# Patient Record
Sex: FEMALE | Race: WHITE | NOT HISPANIC OR LATINO | URBAN - METROPOLITAN AREA
[De-identification: names, ages, dates, MRNs, and addresses within clinical notes are randomized per-mention and may not be internally consistent; named-entity substitution may affect disease eponyms.]

---

## 2017-01-09 ENCOUNTER — OUTPATIENT (OUTPATIENT)
Dept: OUTPATIENT SERVICES | Facility: HOSPITAL | Age: 67
LOS: 1 days | End: 2017-01-09
Payer: MEDICARE

## 2017-01-09 ENCOUNTER — APPOINTMENT (OUTPATIENT)
Dept: MRI IMAGING | Facility: IMAGING CENTER | Age: 67
End: 2017-01-09

## 2017-01-09 DIAGNOSIS — Z00.8 ENCOUNTER FOR OTHER GENERAL EXAMINATION: ICD-10-CM

## 2017-01-09 PROCEDURE — A9585: CPT

## 2017-01-09 PROCEDURE — C8908: CPT

## 2017-01-09 PROCEDURE — C8937: CPT

## 2017-01-09 PROCEDURE — 82565 ASSAY OF CREATININE: CPT

## 2017-07-28 ENCOUNTER — OUTPATIENT (OUTPATIENT)
Dept: OUTPATIENT SERVICES | Facility: HOSPITAL | Age: 67
LOS: 1 days | End: 2017-07-28
Payer: MEDICARE

## 2017-07-28 ENCOUNTER — APPOINTMENT (OUTPATIENT)
Dept: ULTRASOUND IMAGING | Facility: IMAGING CENTER | Age: 67
End: 2017-07-28
Payer: MEDICARE

## 2017-07-28 ENCOUNTER — APPOINTMENT (OUTPATIENT)
Dept: MAMMOGRAPHY | Facility: IMAGING CENTER | Age: 67
End: 2017-07-28
Payer: MEDICARE

## 2017-07-28 DIAGNOSIS — Z00.8 ENCOUNTER FOR OTHER GENERAL EXAMINATION: ICD-10-CM

## 2017-07-28 PROCEDURE — G0279: CPT | Mod: 26

## 2017-07-28 PROCEDURE — G0204: CPT | Mod: 26

## 2017-07-28 PROCEDURE — 76641 ULTRASOUND BREAST COMPLETE: CPT | Mod: 26,50

## 2017-07-28 PROCEDURE — 76641 ULTRASOUND BREAST COMPLETE: CPT

## 2017-07-28 PROCEDURE — 77066 DX MAMMO INCL CAD BI: CPT

## 2017-07-28 PROCEDURE — G0279: CPT

## 2017-10-04 ENCOUNTER — APPOINTMENT (OUTPATIENT)
Dept: SURGERY | Facility: CLINIC | Age: 67
End: 2017-10-04
Payer: MEDICARE

## 2017-10-04 PROCEDURE — 99213K: CUSTOM

## 2018-01-10 ENCOUNTER — OUTPATIENT (OUTPATIENT)
Dept: OUTPATIENT SERVICES | Facility: HOSPITAL | Age: 68
LOS: 1 days | End: 2018-01-10
Payer: MEDICARE

## 2018-01-10 ENCOUNTER — APPOINTMENT (OUTPATIENT)
Dept: MRI IMAGING | Facility: IMAGING CENTER | Age: 68
End: 2018-01-10
Payer: MEDICARE

## 2018-01-10 DIAGNOSIS — Z00.8 ENCOUNTER FOR OTHER GENERAL EXAMINATION: ICD-10-CM

## 2018-01-11 PROCEDURE — A9585: CPT

## 2018-01-11 PROCEDURE — 77059 MRI BREAST BILATERAL: CPT | Mod: 26

## 2018-01-11 PROCEDURE — C8937: CPT

## 2018-01-11 PROCEDURE — C8908: CPT

## 2018-01-11 PROCEDURE — 0159T: CPT | Mod: 26

## 2018-07-30 ENCOUNTER — OUTPATIENT (OUTPATIENT)
Dept: OUTPATIENT SERVICES | Facility: HOSPITAL | Age: 68
LOS: 1 days | End: 2018-07-30
Payer: MEDICARE

## 2018-07-30 ENCOUNTER — APPOINTMENT (OUTPATIENT)
Dept: MAMMOGRAPHY | Facility: IMAGING CENTER | Age: 68
End: 2018-07-30
Payer: MEDICARE

## 2018-07-30 ENCOUNTER — APPOINTMENT (OUTPATIENT)
Dept: ULTRASOUND IMAGING | Facility: IMAGING CENTER | Age: 68
End: 2018-07-30
Payer: MEDICARE

## 2018-07-30 DIAGNOSIS — Z00.8 ENCOUNTER FOR OTHER GENERAL EXAMINATION: ICD-10-CM

## 2018-07-30 PROCEDURE — 76641 ULTRASOUND BREAST COMPLETE: CPT | Mod: 26,50

## 2018-07-30 PROCEDURE — 77066 DX MAMMO INCL CAD BI: CPT | Mod: 26

## 2018-07-30 PROCEDURE — 76641 ULTRASOUND BREAST COMPLETE: CPT

## 2018-07-30 PROCEDURE — G0279: CPT | Mod: 26

## 2018-07-30 PROCEDURE — G0279: CPT

## 2018-07-30 PROCEDURE — 77066 DX MAMMO INCL CAD BI: CPT

## 2018-10-29 ENCOUNTER — APPOINTMENT (OUTPATIENT)
Dept: SURGERY | Facility: CLINIC | Age: 68
End: 2018-10-29
Payer: MEDICARE

## 2018-10-29 PROCEDURE — 99213K: CUSTOM

## 2019-01-16 ENCOUNTER — OUTPATIENT (OUTPATIENT)
Dept: OUTPATIENT SERVICES | Facility: HOSPITAL | Age: 69
LOS: 1 days | End: 2019-01-16
Payer: MEDICARE

## 2019-01-16 ENCOUNTER — APPOINTMENT (OUTPATIENT)
Dept: MRI IMAGING | Facility: IMAGING CENTER | Age: 69
End: 2019-01-16
Payer: MEDICARE

## 2019-01-16 DIAGNOSIS — Z00.8 ENCOUNTER FOR OTHER GENERAL EXAMINATION: ICD-10-CM

## 2019-01-16 PROCEDURE — 77049 MRI BREAST C-+ W/CAD BI: CPT | Mod: 26

## 2019-01-16 PROCEDURE — C8937: CPT

## 2019-01-16 PROCEDURE — A9585: CPT

## 2019-01-16 PROCEDURE — C8908: CPT

## 2019-07-31 ENCOUNTER — APPOINTMENT (OUTPATIENT)
Dept: ULTRASOUND IMAGING | Facility: IMAGING CENTER | Age: 69
End: 2019-07-31
Payer: MEDICARE

## 2019-07-31 ENCOUNTER — APPOINTMENT (OUTPATIENT)
Dept: MAMMOGRAPHY | Facility: IMAGING CENTER | Age: 69
End: 2019-07-31
Payer: MEDICARE

## 2019-07-31 ENCOUNTER — OUTPATIENT (OUTPATIENT)
Dept: OUTPATIENT SERVICES | Facility: HOSPITAL | Age: 69
LOS: 1 days | End: 2019-07-31
Payer: MEDICARE

## 2019-07-31 DIAGNOSIS — Z00.8 ENCOUNTER FOR OTHER GENERAL EXAMINATION: ICD-10-CM

## 2019-07-31 PROCEDURE — 77066 DX MAMMO INCL CAD BI: CPT | Mod: 26

## 2019-07-31 PROCEDURE — G0279: CPT | Mod: 26

## 2019-07-31 PROCEDURE — 76641 ULTRASOUND BREAST COMPLETE: CPT | Mod: 26,50

## 2019-07-31 PROCEDURE — 77066 DX MAMMO INCL CAD BI: CPT

## 2019-07-31 PROCEDURE — 76641 ULTRASOUND BREAST COMPLETE: CPT

## 2019-07-31 PROCEDURE — G0279: CPT

## 2019-10-16 ENCOUNTER — APPOINTMENT (OUTPATIENT)
Dept: SURGERY | Facility: CLINIC | Age: 69
End: 2019-10-16
Payer: MEDICARE

## 2019-10-16 PROCEDURE — 99213K: CUSTOM

## 2020-01-13 ENCOUNTER — OUTPATIENT (OUTPATIENT)
Dept: OUTPATIENT SERVICES | Facility: HOSPITAL | Age: 70
LOS: 1 days | End: 2020-01-13
Payer: MEDICARE

## 2020-01-13 ENCOUNTER — APPOINTMENT (OUTPATIENT)
Dept: MRI IMAGING | Facility: IMAGING CENTER | Age: 70
End: 2020-01-13
Payer: MEDICARE

## 2020-01-13 DIAGNOSIS — Z00.8 ENCOUNTER FOR OTHER GENERAL EXAMINATION: ICD-10-CM

## 2020-01-13 PROCEDURE — A9585: CPT

## 2020-01-13 PROCEDURE — C8937: CPT

## 2020-01-13 PROCEDURE — 77049 MRI BREAST C-+ W/CAD BI: CPT | Mod: 26

## 2020-01-13 PROCEDURE — C8908: CPT

## 2020-04-09 ENCOUNTER — APPOINTMENT (OUTPATIENT)
Dept: INTERNAL MEDICINE | Facility: CLINIC | Age: 70
End: 2020-04-09

## 2020-04-14 DIAGNOSIS — Z85.3 PERSONAL HISTORY OF MALIGNANT NEOPLASM OF BREAST: ICD-10-CM

## 2020-07-16 ENCOUNTER — TRANSCRIPTION ENCOUNTER (OUTPATIENT)
Age: 70
End: 2020-07-16

## 2020-07-16 ENCOUNTER — APPOINTMENT (OUTPATIENT)
Dept: INTERNAL MEDICINE | Facility: CLINIC | Age: 70
End: 2020-07-16
Payer: MEDICARE

## 2020-07-16 VITALS
DIASTOLIC BLOOD PRESSURE: 70 MMHG | RESPIRATION RATE: 16 BRPM | TEMPERATURE: 98.2 F | HEIGHT: 63 IN | BODY MASS INDEX: 32.43 KG/M2 | WEIGHT: 183 LBS | SYSTOLIC BLOOD PRESSURE: 118 MMHG | OXYGEN SATURATION: 97 % | HEART RATE: 87 BPM

## 2020-07-16 DIAGNOSIS — Z80.3 FAMILY HISTORY OF MALIGNANT NEOPLASM OF BREAST: ICD-10-CM

## 2020-07-16 PROCEDURE — 36415 COLL VENOUS BLD VENIPUNCTURE: CPT

## 2020-07-16 PROCEDURE — G0439: CPT

## 2020-07-16 RX ORDER — CEPHALEXIN 500 MG/1
500 CAPSULE ORAL 4 TIMES DAILY
Qty: 30 | Refills: 0 | Status: DISCONTINUED | COMMUNITY
Start: 2018-07-02 | End: 2020-07-16

## 2020-07-16 NOTE — HEALTH RISK ASSESSMENT
[Very Good] : ~his/her~  mood as very good [Yes] : Yes [Monthly or less (1 pt)] : Monthly or less (1 point) [1 or 2 (0 pts)] : 1 or 2 (0 points) [Never (0 pts)] : Never (0 points) [No] : In the past 12 months have you used drugs other than those required for medical reasons? No [No falls in past year] : Patient reported no falls in the past year [0] : 1) Little interest or pleasure doing things: Not at all (0) [No Retinopathy] : No retinopathy [Patient reported PAP Smear was normal] : Patient reported PAP Smear was normal [Patient reported mammogram was normal] : Patient reported mammogram was normal [Patient reported bone density results were normal] : Patient reported bone density results were normal [Patient reported colonoscopy was normal] : Patient reported colonoscopy was normal [With Significant Other] : lives with significant other [Graduate School] : graduate school [Retired] : retired [] :  [# Of Children ___] : has [unfilled] children [Sexually Active] : sexually active [Feels Safe at Home] : Feels safe at home [Fully functional (using the telephone, shopping, preparing meals, housekeeping, doing laundry, using] : Fully functional and needs no help or supervision to perform IADLs (using the telephone, shopping, preparing meals, housekeeping, doing laundry, using transportation, managing medications and managing finances) [Fully functional (bathing, dressing, toileting, transferring, walking, feeding)] : Fully functional (bathing, dressing, toileting, transferring, walking, feeding) [Smoke Detector] : smoke detector [Carbon Monoxide Detector] : carbon monoxide detector [Safety elements used in home] : safety elements used in home [Sunscreen] : uses sunscreen [Seat Belt] :  uses seat belt [With Patient/Caregiver] : With Patient/Caregiver [de-identified] : walking  [EyeExamDate] : 8/19 [High Risk Behavior] : no high risk behavior [Reports changes in hearing] : Reports no changes in hearing [Reports changes in vision] : Reports no changes in vision [Reports normal functional visual acuity (ie: able to read med bottle)] : Reports poor functional visual acuity.  [Reports changes in dental health] : Reports no changes in dental health [Guns at Home] : no guns at home [Travel to Developing Areas] : does not  travel to developing areas [TB Exposure] : is not being exposed to tuberculosis [Caregiver Concerns] : does not have caregiver concerns [MammogramDate] : 07/19 [MammogramComments] : appt next week  [PapSmearDate] : 2018 [BoneDensityDate] : 2018 [ColonoscopyDate] : 9/18 [ColonoscopyComments] : next due in 2023 [de-identified] : teacher [AdvancecareDate] : 07/16

## 2020-07-16 NOTE — HISTORY OF PRESENT ILLNESS
[FreeTextEntry1] : Here for annual full PE  [de-identified] : here for annual full PE, complains of left knee pain especially following her walking. Also doing a lot of cleaning. Lost about 10-12 lbs and her labs are better.

## 2020-07-16 NOTE — PHYSICAL EXAM
[No Masses] : no palpable masses [No Nipple Discharge] : no nipple discharge [No Axillary Lymphadenopathy] : no axillary lymphadenopathy [Declined Rectal Exam] : declined rectal exam [Normal] : affect was normal and insight and judgment were intact [de-identified] : decreased volume on the left with RT changes , no dc no masses  [de-identified] : obese

## 2020-07-16 NOTE — ASSESSMENT
[FreeTextEntry1] : 1. DM : wt is down 11 lbs since 11/19( 194 per old records ) likely her A1c is as well, to have labs drawn today. \par 2. Breast Ca: still see Dr Bautista and now off all adjuvant meds\par 3. HCM: up to date , going to Optho in [place], mammo in aug and colon due in 2023 per old records\par 4 Meds: losartan for renal protection, Farxiga 10, lipitor 10.

## 2020-07-20 LAB
ALBUMIN SERPL ELPH-MCNC: 5.1 G/DL
ALP BLD-CCNC: 91 U/L
ALT SERPL-CCNC: 38 U/L
ANION GAP SERPL CALC-SCNC: 18 MMOL/L
AST SERPL-CCNC: 31 U/L
BASOPHILS # BLD AUTO: 0.05 K/UL
BASOPHILS NFR BLD AUTO: 0.9 %
BILIRUB SERPL-MCNC: 0.6 MG/DL
BUN SERPL-MCNC: 15 MG/DL
CALCIUM SERPL-MCNC: 9.9 MG/DL
CHLORIDE SERPL-SCNC: 99 MMOL/L
CHOLEST SERPL-MCNC: 161 MG/DL
CHOLEST/HDLC SERPL: 3.2 RATIO
CO2 SERPL-SCNC: 23 MMOL/L
CREAT SERPL-MCNC: 0.69 MG/DL
EOSINOPHIL # BLD AUTO: 0.11 K/UL
EOSINOPHIL NFR BLD AUTO: 2 %
ESTIMATED AVERAGE GLUCOSE: 146 MG/DL
GLUCOSE SERPL-MCNC: 85 MG/DL
HBA1C MFR BLD HPLC: 6.7 %
HCT VFR BLD CALC: 54.1 %
HDLC SERPL-MCNC: 50 MG/DL
HGB BLD-MCNC: 16.7 G/DL
IMM GRANULOCYTES NFR BLD AUTO: 0.2 %
LDLC SERPL CALC-MCNC: 92 MG/DL
LYMPHOCYTES # BLD AUTO: 1.59 K/UL
LYMPHOCYTES NFR BLD AUTO: 28.3 %
MAN DIFF?: NORMAL
MCHC RBC-ENTMCNC: 29.6 PG
MCHC RBC-ENTMCNC: 30.9 GM/DL
MCV RBC AUTO: 95.8 FL
MONOCYTES # BLD AUTO: 0.52 K/UL
MONOCYTES NFR BLD AUTO: 9.3 %
NEUTROPHILS # BLD AUTO: 3.33 K/UL
NEUTROPHILS NFR BLD AUTO: 59.3 %
PLATELET # BLD AUTO: 193 K/UL
POTASSIUM SERPL-SCNC: 5.3 MMOL/L
PROT SERPL-MCNC: 7.3 G/DL
RBC # BLD: 5.65 M/UL
RBC # FLD: 14 %
SODIUM SERPL-SCNC: 140 MMOL/L
TRIGL SERPL-MCNC: 93 MG/DL
TSH SERPL-ACNC: 1.9 UIU/ML
WBC # FLD AUTO: 5.61 K/UL

## 2020-07-21 ENCOUNTER — RX RENEWAL (OUTPATIENT)
Age: 70
End: 2020-07-21

## 2020-08-03 ENCOUNTER — RESULT REVIEW (OUTPATIENT)
Age: 70
End: 2020-08-03

## 2020-08-03 ENCOUNTER — OUTPATIENT (OUTPATIENT)
Dept: OUTPATIENT SERVICES | Facility: HOSPITAL | Age: 70
LOS: 1 days | End: 2020-08-03
Payer: MEDICARE

## 2020-08-03 ENCOUNTER — APPOINTMENT (OUTPATIENT)
Dept: ULTRASOUND IMAGING | Facility: IMAGING CENTER | Age: 70
End: 2020-08-03
Payer: MEDICARE

## 2020-08-03 ENCOUNTER — APPOINTMENT (OUTPATIENT)
Dept: MAMMOGRAPHY | Facility: IMAGING CENTER | Age: 70
End: 2020-08-03
Payer: MEDICARE

## 2020-08-03 DIAGNOSIS — Z00.8 ENCOUNTER FOR OTHER GENERAL EXAMINATION: ICD-10-CM

## 2020-08-03 PROCEDURE — G0279: CPT

## 2020-08-03 PROCEDURE — 76641 ULTRASOUND BREAST COMPLETE: CPT

## 2020-08-03 PROCEDURE — 76641 ULTRASOUND BREAST COMPLETE: CPT | Mod: 26,50

## 2020-08-03 PROCEDURE — 77063 BREAST TOMOSYNTHESIS BI: CPT

## 2020-08-03 PROCEDURE — 77067 SCR MAMMO BI INCL CAD: CPT | Mod: 26

## 2020-08-03 PROCEDURE — 77066 DX MAMMO INCL CAD BI: CPT

## 2020-08-03 PROCEDURE — 77063 BREAST TOMOSYNTHESIS BI: CPT | Mod: 26

## 2020-08-03 PROCEDURE — 77067 SCR MAMMO BI INCL CAD: CPT

## 2020-10-05 ENCOUNTER — APPOINTMENT (OUTPATIENT)
Dept: ORTHOPEDIC SURGERY | Facility: CLINIC | Age: 70
End: 2020-10-05
Payer: MEDICARE

## 2020-10-05 VITALS
HEART RATE: 76 BPM | HEIGHT: 63 IN | BODY MASS INDEX: 32.43 KG/M2 | SYSTOLIC BLOOD PRESSURE: 113 MMHG | TEMPERATURE: 97.8 F | DIASTOLIC BLOOD PRESSURE: 71 MMHG | WEIGHT: 183 LBS

## 2020-10-05 DIAGNOSIS — Z82.61 FAMILY HISTORY OF ARTHRITIS: ICD-10-CM

## 2020-10-05 DIAGNOSIS — Z78.9 OTHER SPECIFIED HEALTH STATUS: ICD-10-CM

## 2020-10-05 DIAGNOSIS — Z56.0 UNEMPLOYMENT, UNSPECIFIED: ICD-10-CM

## 2020-10-05 DIAGNOSIS — Z87.39 PERSONAL HISTORY OF OTHER DISEASES OF THE MUSCULOSKELETAL SYSTEM AND CONNECTIVE TISSUE: ICD-10-CM

## 2020-10-05 PROCEDURE — 99204 OFFICE O/P NEW MOD 45 MIN: CPT

## 2020-10-05 PROCEDURE — 73564 X-RAY EXAM KNEE 4 OR MORE: CPT | Mod: LT

## 2020-10-05 SDOH — ECONOMIC STABILITY - INCOME SECURITY: UNEMPLOYMENT, UNSPECIFIED: Z56.0

## 2020-10-05 NOTE — DISCUSSION/SUMMARY
[de-identified] : The patient has arthritis of the left knee.  I have discussed the pathology, natural history and treatment options with her.  She will try a course of over-the-counter medication.  If this is not satisfactory she will consider physical therapy.  She will work on home exercises.  She will return as needed.

## 2020-10-05 NOTE — PHYSICAL EXAM
[LE] : Sensory: Intact in bilateral lower extremities [Knee] : patellar 2+ and symmetric bilaterally [Ankle] : ankle 2+ and symmetric bilaterally [Plant] : plantar 2+ and symmetric bilaterally [DP] : dorsalis pedis 2+ and symmetric bilaterally [PT] : posterior tibial 2+ and symmetric bilaterally [Normal] : Alert and in no acute distress [Poor Appearance] : well-appearing [Acute Distress] : not in acute distress [Obese] : not obese [de-identified] : The patient has no respiratory distress. Mood and affect are normal. The patient is alert and oriented to person, place and time.\par There is no pain with active or passive motion of the hips.  There is no tenderness of either hip.  Examination of the knees demonstrates mild anteromedial tenderness of the left knee.  Quadriceps and hamstring function are intact.  The collateral and cruciate ligaments are stable bilaterally.  Range of motion 0 to 120 degrees left, 0 to 125 degrees right.  The calves are soft and nontender.  The skin is intact.  There is no lymphedema. [de-identified] : AP, lateral, tunnel and sunrise x-rays of the left knee demonstrate no fracture or dislocation.  There are degenerative changes especially in the medial and patellofemoral compartments.

## 2020-10-05 NOTE — HISTORY OF PRESENT ILLNESS
[de-identified] : 70 year old female presents today with left knee pain x 3 months. She complains of pain on the medial aspect of the knee. She states that she has been walking one hour per day during the pandemic. She experiences pain in her knee after waking. The pain improves with a couple days of rest. She has pain when standing from a low chair and with stair climbing. She has tried ice and mineral ice with no relief.

## 2020-10-13 ENCOUNTER — RX RENEWAL (OUTPATIENT)
Age: 70
End: 2020-10-13

## 2020-10-14 ENCOUNTER — APPOINTMENT (OUTPATIENT)
Dept: SURGERY | Facility: CLINIC | Age: 70
End: 2020-10-14
Payer: MEDICARE

## 2020-10-14 PROCEDURE — 99213K: CUSTOM

## 2020-10-16 LAB
ESTIMATED AVERAGE GLUCOSE: 154 MG/DL
HBA1C MFR BLD HPLC: 7 %

## 2020-10-19 LAB
ALBUMIN SERPL ELPH-MCNC: 4.6 G/DL
ALP BLD-CCNC: 77 U/L
ALT SERPL-CCNC: 28 U/L
ANION GAP SERPL CALC-SCNC: 15 MMOL/L
AST SERPL-CCNC: 26 U/L
BILIRUB SERPL-MCNC: 0.6 MG/DL
BUN SERPL-MCNC: 13 MG/DL
CALCIUM SERPL-MCNC: 9.3 MG/DL
CHLORIDE SERPL-SCNC: 103 MMOL/L
CO2 SERPL-SCNC: 21 MMOL/L
CREAT SERPL-MCNC: 0.62 MG/DL
GLUCOSE SERPL-MCNC: 137 MG/DL
POTASSIUM SERPL-SCNC: 4.5 MMOL/L
PROT SERPL-MCNC: 6.6 G/DL
SODIUM SERPL-SCNC: 139 MMOL/L

## 2020-10-20 ENCOUNTER — APPOINTMENT (OUTPATIENT)
Dept: INTERNAL MEDICINE | Facility: CLINIC | Age: 70
End: 2020-10-20
Payer: MEDICARE

## 2020-10-20 VITALS
TEMPERATURE: 98.2 F | SYSTOLIC BLOOD PRESSURE: 110 MMHG | WEIGHT: 186 LBS | HEIGHT: 63 IN | OXYGEN SATURATION: 96 % | DIASTOLIC BLOOD PRESSURE: 70 MMHG | BODY MASS INDEX: 32.96 KG/M2 | HEART RATE: 82 BPM

## 2020-10-20 PROCEDURE — 99213 OFFICE O/P EST LOW 20 MIN: CPT | Mod: 25

## 2020-10-20 PROCEDURE — G0008: CPT

## 2020-10-20 PROCEDURE — 90662 IIV NO PRSV INCREASED AG IM: CPT

## 2020-10-20 NOTE — HISTORY OF PRESENT ILLNESS
[de-identified] : No new complaints. Went to Ortho due to a painful L knee, dx of OA , walking  and try NSAIDS prn and generally doing well. REceived the Flu vaccine  [FreeTextEntry1] : Here for follow up

## 2020-10-20 NOTE — ASSESSMENT
[FreeTextEntry1] : 1. DM : not walking as much , A1c has increased from 6.7 to 7, will try again to walk more, and has an elliptical machine at home to increase that \par 2. HLD controlled \par 3 Received flu vaccine \par 4 . F/u in 3 mos.

## 2021-01-19 ENCOUNTER — RX RENEWAL (OUTPATIENT)
Age: 71
End: 2021-01-19

## 2021-01-19 ENCOUNTER — LABORATORY RESULT (OUTPATIENT)
Age: 71
End: 2021-01-19

## 2021-01-21 ENCOUNTER — APPOINTMENT (OUTPATIENT)
Dept: INTERNAL MEDICINE | Facility: CLINIC | Age: 71
End: 2021-01-21
Payer: MEDICARE

## 2021-01-21 VITALS
HEART RATE: 84 BPM | TEMPERATURE: 98.1 F | BODY MASS INDEX: 33.66 KG/M2 | OXYGEN SATURATION: 97 % | SYSTOLIC BLOOD PRESSURE: 140 MMHG | HEIGHT: 63 IN | DIASTOLIC BLOOD PRESSURE: 77 MMHG | WEIGHT: 190 LBS

## 2021-01-21 VITALS — SYSTOLIC BLOOD PRESSURE: 130 MMHG | DIASTOLIC BLOOD PRESSURE: 80 MMHG

## 2021-01-21 PROCEDURE — 99214 OFFICE O/P EST MOD 30 MIN: CPT

## 2021-01-21 NOTE — ASSESSMENT
[FreeTextEntry1] : 1. DM : from 6.7 to 7. 0 wt is up about 5 lbs, to try to increase her exercise, decrease her nighttime eating \par 2. choles: 143/52/70/103 perfect control\par 3. Breast Ca : per onc, MRI on 2.4.21\par 4. Heme: for genetic testing and lab follow up. \par 5 labs renewed

## 2021-01-21 NOTE — HISTORY OF PRESENT ILLNESS
[FreeTextEntry1] : Here for follow up, received first vaccine( Pfizer) received was tired for 24 hours  [de-identified] : No complaints, having a hard time walking as her left knee is bothering her( and seen by ortho) . Mother( 98) received her vaccine . Seen by Servando and concerned about a mildly elevated Lymph and Hct

## 2021-02-04 ENCOUNTER — RESULT REVIEW (OUTPATIENT)
Age: 71
End: 2021-02-04

## 2021-02-04 ENCOUNTER — APPOINTMENT (OUTPATIENT)
Dept: MRI IMAGING | Facility: IMAGING CENTER | Age: 71
End: 2021-02-04
Payer: MEDICARE

## 2021-02-04 ENCOUNTER — OUTPATIENT (OUTPATIENT)
Dept: OUTPATIENT SERVICES | Facility: HOSPITAL | Age: 71
LOS: 1 days | End: 2021-02-04
Payer: MEDICARE

## 2021-02-04 DIAGNOSIS — Z00.8 ENCOUNTER FOR OTHER GENERAL EXAMINATION: ICD-10-CM

## 2021-02-04 PROCEDURE — C8908: CPT

## 2021-02-04 PROCEDURE — 77049 MRI BREAST C-+ W/CAD BI: CPT | Mod: 26,MH

## 2021-02-04 PROCEDURE — C8937: CPT

## 2021-02-04 PROCEDURE — A9585: CPT

## 2021-04-19 ENCOUNTER — LABORATORY RESULT (OUTPATIENT)
Age: 71
End: 2021-04-19

## 2021-04-22 ENCOUNTER — APPOINTMENT (OUTPATIENT)
Dept: INTERNAL MEDICINE | Facility: CLINIC | Age: 71
End: 2021-04-22
Payer: MEDICARE

## 2021-04-22 VITALS
OXYGEN SATURATION: 96 % | BODY MASS INDEX: 33.66 KG/M2 | DIASTOLIC BLOOD PRESSURE: 68 MMHG | HEIGHT: 63 IN | WEIGHT: 190 LBS | HEART RATE: 95 BPM | SYSTOLIC BLOOD PRESSURE: 126 MMHG | TEMPERATURE: 98.2 F

## 2021-04-22 PROCEDURE — 99214 OFFICE O/P EST MOD 30 MIN: CPT

## 2021-04-22 NOTE — HISTORY OF PRESENT ILLNESS
[FreeTextEntry1] : Here for follow up. Doing well however her A1c  is up to 7.8 [de-identified] : Has noticed that her fasting fs is up at 140's . Otherwise well. Does not feel that her eating habits have not changed.

## 2021-04-22 NOTE — ASSESSMENT
[FreeTextEntry1] : 1. DM : from 6.7 to 7. 0 to 7.7  wt is about the same. Still  eating at night, riley nuts or turkey and cheese , at about 11 pm . She is not really hungry  at night , eating out of stress, suggested to try some herbal  tea, decaf drink. Will add some Januvia to the regimen, if no improvement then consider some GLP-1 inhibitor \par 2. choles: 143/52/70/103 perfect control\par 3. Breast Ca : per onc, MRI on 2.4.21 was wnl\par 4. Heme: for genetic testing  no changed and not markers found and lab follow up. \par 5 labs renewed all wnl except a1c/glucose. \par full PE in 3 mos.

## 2021-04-23 ENCOUNTER — TRANSCRIPTION ENCOUNTER (OUTPATIENT)
Age: 71
End: 2021-04-23

## 2021-07-16 ENCOUNTER — LABORATORY RESULT (OUTPATIENT)
Age: 71
End: 2021-07-16

## 2021-07-18 ENCOUNTER — NON-APPOINTMENT (OUTPATIENT)
Age: 71
End: 2021-07-18

## 2021-07-19 ENCOUNTER — LABORATORY RESULT (OUTPATIENT)
Age: 71
End: 2021-07-19

## 2021-07-20 ENCOUNTER — APPOINTMENT (OUTPATIENT)
Dept: INTERNAL MEDICINE | Facility: CLINIC | Age: 71
End: 2021-07-20
Payer: MEDICARE

## 2021-07-20 VITALS
SYSTOLIC BLOOD PRESSURE: 130 MMHG | HEIGHT: 63 IN | BODY MASS INDEX: 34.55 KG/M2 | OXYGEN SATURATION: 95 % | WEIGHT: 195 LBS | DIASTOLIC BLOOD PRESSURE: 78 MMHG | TEMPERATURE: 97.8 F | HEART RATE: 108 BPM

## 2021-07-20 PROCEDURE — G0444 DEPRESSION SCREEN ANNUAL: CPT

## 2021-07-20 PROCEDURE — G0439: CPT

## 2021-07-20 NOTE — PHYSICAL EXAM
[Normal Appearance] : normal in appearance [No Masses] : no palpable masses [No Nipple Discharge] : no nipple discharge [Normal Sphincter Tone] : normal sphincter tone [No Mass] : no mass [Stool Occult Blood] : stool negative for occult blood [Normal] : affect was normal and insight and judgment were intact [de-identified] : decreased volume on the left

## 2021-07-20 NOTE — HEALTH RISK ASSESSMENT
[] : No [No] : No [No falls in past year] : Patient reported no falls in the past year [0] : 2) Feeling down, depressed, or hopeless: Not at all (0) [facility] [Patient reported mammogram was normal] : Patient reported mammogram was normal [None] : None [With Significant Other] : lives with significant other [# of Members in Household ___] :  household currently consist of [unfilled] member(s) [Retired] : retired [Graduate School] : graduate school [] :  [# Of Children ___] : has [unfilled] children [Feels Safe at Home] : Feels safe at home [Seat Belt] :  uses seat belt [Sunscreen] : uses sunscreen [Travel to Developing Areas] : does not  travel to developing areas [TB Exposure] : is not being exposed to tuberculosis [Caregiver Concerns] : does not have caregiver concerns [MammogramDate] : 8/20 [PapSmearDate] : n/a [ColonoscopyDate] : 2018 [ColonoscopyComments] : 2023 due  [FreeTextEntry2] : teacher  [I will adhere to the patient's wishes.] : I will adhere to the patient's wishes. [AdvancecareDate] : 7/20/21

## 2021-07-20 NOTE — ASSESSMENT
[FreeTextEntry1] : 1. DM : from 7.8 to 7.1   wt is about the same. Still  eating at night, riley nuts or turkey and cheese , at about 11 pm . She is not really hungry  at night , eating out of stress, suggested to try some herbal  tea, decaf drink. Will add some Januvia to the regimen, if no improvement then consider some GLP-1 inhibitor \par 2. choles: 150/46/77 141  perfect control\par 3. Breast Ca : per onc, MRI on 2.4.21 was wnl and mammo in August due , now seeing Dr Jesus Garza \par 4. Heme:  genetic testing   and still no change and no markers found and lab follow up. \par 5 labs renewed all wnl except a1c/glucose. \par 6. HCM pna x2 received, shingrix x 2 received, covid x 2 received, mammo in Aug, colon on 2023, optho Aug. Heme routine follow up, gyn in Feb \par  in 3 mos.

## 2021-08-04 ENCOUNTER — OUTPATIENT (OUTPATIENT)
Dept: OUTPATIENT SERVICES | Facility: HOSPITAL | Age: 71
LOS: 1 days | End: 2021-08-04
Payer: MEDICARE

## 2021-08-04 ENCOUNTER — APPOINTMENT (OUTPATIENT)
Dept: MAMMOGRAPHY | Facility: IMAGING CENTER | Age: 71
End: 2021-08-04
Payer: MEDICARE

## 2021-08-04 ENCOUNTER — RESULT REVIEW (OUTPATIENT)
Age: 71
End: 2021-08-04

## 2021-08-04 ENCOUNTER — APPOINTMENT (OUTPATIENT)
Dept: ULTRASOUND IMAGING | Facility: IMAGING CENTER | Age: 71
End: 2021-08-04
Payer: MEDICARE

## 2021-08-04 DIAGNOSIS — Z00.8 ENCOUNTER FOR OTHER GENERAL EXAMINATION: ICD-10-CM

## 2021-08-04 PROCEDURE — 77063 BREAST TOMOSYNTHESIS BI: CPT | Mod: 26

## 2021-08-04 PROCEDURE — 77067 SCR MAMMO BI INCL CAD: CPT | Mod: 26

## 2021-08-04 PROCEDURE — 77063 BREAST TOMOSYNTHESIS BI: CPT

## 2021-08-04 PROCEDURE — 77067 SCR MAMMO BI INCL CAD: CPT

## 2021-08-04 PROCEDURE — 76641 ULTRASOUND BREAST COMPLETE: CPT | Mod: 26,50

## 2021-08-04 PROCEDURE — 76641 ULTRASOUND BREAST COMPLETE: CPT

## 2021-10-19 ENCOUNTER — APPOINTMENT (OUTPATIENT)
Dept: INTERNAL MEDICINE | Facility: CLINIC | Age: 71
End: 2021-10-19
Payer: MEDICARE

## 2021-10-19 VITALS
BODY MASS INDEX: 34.01 KG/M2 | WEIGHT: 192 LBS | SYSTOLIC BLOOD PRESSURE: 110 MMHG | OXYGEN SATURATION: 97 % | HEART RATE: 80 BPM | DIASTOLIC BLOOD PRESSURE: 70 MMHG | TEMPERATURE: 97.9 F

## 2021-10-19 DIAGNOSIS — M17.12 UNILATERAL PRIMARY OSTEOARTHRITIS, LEFT KNEE: ICD-10-CM

## 2021-10-19 DIAGNOSIS — Z23 ENCOUNTER FOR IMMUNIZATION: ICD-10-CM

## 2021-10-19 LAB — HBA1C MFR BLD HPLC: 7.1

## 2021-10-19 PROCEDURE — 90662 IIV NO PRSV INCREASED AG IM: CPT

## 2021-10-19 PROCEDURE — G0008: CPT

## 2021-10-19 PROCEDURE — 99214 OFFICE O/P EST MOD 30 MIN: CPT | Mod: 25

## 2021-10-19 PROCEDURE — 83036 HEMOGLOBIN GLYCOSYLATED A1C: CPT | Mod: QW

## 2021-10-19 NOTE — ASSESSMENT
[FreeTextEntry1] : 1. DM : from 7.8 to 7.1and will  recheck today,   wt is down 3 lbs. Still   stopped eating at night, riley nuts or turkey and cheese , at about 11 pm . Gen is eating out of stress, suggested to try some herbal  tea, decaf drink. Will add some Januvia to the regimen, if no improvement then consider some GLP-1 inhibitor \par 2. choles: 150/46/77/ 141  perfect control in the past . \par 3. Breast Ca : per onc, MRI on 2.4.21 was wnl and mammo in August due , now seeing Dr Jesus Garza \par 4. Heme:  genetic testing   and still no change and no markers found and lab follow up. \par 5 labs renewed all wnl except a1c/glucose. to repeat \par 6. HCM pna x2 received, shingrix x 2 received, covid x 2 received, mammo in Aug, colon on 2023, optho Aug. Heme routine follow up, gyn in Feb , HD flu in ra given today \par f/u  in 3 mos.

## 2021-10-19 NOTE — HISTORY OF PRESENT ILLNESS
[FreeTextEntry1] : Here for follow up for her dm and htn. . Doing well however her A1c  last = 7.1 [de-identified] : Here for f/u , no new complaints

## 2021-11-15 ENCOUNTER — APPOINTMENT (OUTPATIENT)
Dept: SURGERY | Facility: CLINIC | Age: 71
End: 2021-11-15
Payer: MEDICARE

## 2021-11-15 PROCEDURE — 99213K: CUSTOM

## 2021-11-16 ENCOUNTER — FORM ENCOUNTER (OUTPATIENT)
Age: 71
End: 2021-11-16

## 2022-01-10 ENCOUNTER — RX RENEWAL (OUTPATIENT)
Age: 72
End: 2022-01-10

## 2022-01-20 ENCOUNTER — APPOINTMENT (OUTPATIENT)
Dept: INTERNAL MEDICINE | Facility: CLINIC | Age: 72
End: 2022-01-20
Payer: MEDICARE

## 2022-01-20 VITALS
WEIGHT: 193 LBS | HEART RATE: 99 BPM | HEIGHT: 63 IN | DIASTOLIC BLOOD PRESSURE: 82 MMHG | TEMPERATURE: 98.1 F | SYSTOLIC BLOOD PRESSURE: 122 MMHG | BODY MASS INDEX: 34.2 KG/M2 | OXYGEN SATURATION: 97 %

## 2022-01-20 LAB — HBA1C MFR BLD HPLC: 7.7

## 2022-01-20 PROCEDURE — 99214 OFFICE O/P EST MOD 30 MIN: CPT | Mod: 25

## 2022-01-20 PROCEDURE — 83036 HEMOGLOBIN GLYCOSYLATED A1C: CPT | Mod: QW

## 2022-01-20 NOTE — HISTORY OF PRESENT ILLNESS
[FreeTextEntry1] : Here for follow up for her dm and htn. . Doing well however her A1c  last = 7.1 [de-identified] : Here for f/u , seen by ortho due to left knee pain had injection( cortisone) , but recommended for TKA  wants to try non operative methods for now

## 2022-01-20 NOTE — ASSESSMENT
[FreeTextEntry1] : 1. DM : from 7.8 to 7.1and will  recheck today,   wt is down 3 lbs. Still  stopped eating at night, riley nuts or turkey and cheese , at about 11 pm . Gen is eating out of stress, suggested to try some herbal  tea, decaf drink. On  Januvia and Farxiga . if no improvement then consider some GLP-1 inhibitor , to repeat A1c today \par 2. choles: 150/46/77/ 141  perfect control in the past , repeat today . \par 3. Breast Ca : per onc, MRI on 2.4.21 repeat in 2/22,  was wnl and mammo in August due ,  followed by MARIA R Rushing. \par 4. Heme:  genetic testing   and still no change and no markers found and lab follow up. \par 5 labs renewed all wnl except a1c/glucose. to repeat \par 6. HCM pna x2 received, shingrix x 2 received, covid x 3 received, mammo in Aug, colon on 2023, optho Aug. Heme routine follow up, gyn in Feb , HD flu in ra given  \par f/u  in 3 mos.

## 2022-01-24 LAB
ALBUMIN SERPL ELPH-MCNC: 4.8 G/DL
ALP BLD-CCNC: 73 U/L
ALT SERPL-CCNC: 25 U/L
ANION GAP SERPL CALC-SCNC: 19 MMOL/L
AST SERPL-CCNC: 22 U/L
BILIRUB SERPL-MCNC: 0.3 MG/DL
BUN SERPL-MCNC: 17 MG/DL
CALCIUM SERPL-MCNC: 10 MG/DL
CHLORIDE SERPL-SCNC: 103 MMOL/L
CHOLEST SERPL-MCNC: 172 MG/DL
CO2 SERPL-SCNC: 19 MMOL/L
CREAT SERPL-MCNC: 0.73 MG/DL
GLUCOSE SERPL-MCNC: 159 MG/DL
HDLC SERPL-MCNC: 61 MG/DL
LDLC SERPL CALC-MCNC: 93 MG/DL
NONHDLC SERPL-MCNC: 110 MG/DL
POTASSIUM SERPL-SCNC: 4.6 MMOL/L
PROT SERPL-MCNC: 7.4 G/DL
SODIUM SERPL-SCNC: 141 MMOL/L
TRIGL SERPL-MCNC: 89 MG/DL

## 2022-02-07 ENCOUNTER — OUTPATIENT (OUTPATIENT)
Dept: OUTPATIENT SERVICES | Facility: HOSPITAL | Age: 72
LOS: 1 days | End: 2022-02-07
Payer: MEDICARE

## 2022-02-07 ENCOUNTER — APPOINTMENT (OUTPATIENT)
Dept: MRI IMAGING | Facility: IMAGING CENTER | Age: 72
End: 2022-02-07
Payer: MEDICARE

## 2022-02-07 DIAGNOSIS — Z00.8 ENCOUNTER FOR OTHER GENERAL EXAMINATION: ICD-10-CM

## 2022-02-07 PROCEDURE — 77049 MRI BREAST C-+ W/CAD BI: CPT | Mod: 26,MH

## 2022-02-07 PROCEDURE — A9585: CPT

## 2022-02-07 PROCEDURE — C8937: CPT

## 2022-02-07 PROCEDURE — C8908: CPT | Mod: MH

## 2022-02-14 ENCOUNTER — APPOINTMENT (OUTPATIENT)
Dept: SURGERY | Facility: CLINIC | Age: 72
End: 2022-02-14
Payer: MEDICARE

## 2022-02-14 PROCEDURE — 99212K: CUSTOM

## 2022-02-24 ENCOUNTER — APPOINTMENT (OUTPATIENT)
Dept: ULTRASOUND IMAGING | Facility: IMAGING CENTER | Age: 72
End: 2022-02-24
Payer: MEDICARE

## 2022-02-24 ENCOUNTER — OUTPATIENT (OUTPATIENT)
Dept: OUTPATIENT SERVICES | Facility: HOSPITAL | Age: 72
LOS: 1 days | End: 2022-02-24
Payer: MEDICARE

## 2022-02-24 DIAGNOSIS — Z00.8 ENCOUNTER FOR OTHER GENERAL EXAMINATION: ICD-10-CM

## 2022-02-24 PROCEDURE — 76642 ULTRASOUND BREAST LIMITED: CPT | Mod: 26,LT

## 2022-02-24 PROCEDURE — 76642 ULTRASOUND BREAST LIMITED: CPT

## 2022-05-02 ENCOUNTER — TRANSCRIPTION ENCOUNTER (OUTPATIENT)
Age: 72
End: 2022-05-02

## 2022-05-15 ENCOUNTER — NON-APPOINTMENT (OUTPATIENT)
Age: 72
End: 2022-05-15

## 2022-05-16 ENCOUNTER — NON-APPOINTMENT (OUTPATIENT)
Age: 72
End: 2022-05-16

## 2022-05-16 NOTE — HISTORY OF PRESENT ILLNESS
[FreeTextEntry1] : + covid testing on 5.15.22  I spoke to her on 5.15 and today, +++ head cold, low grade temp today to 99, no chest congestion, no cough no sob, will not treat with paxlovid at this time if worsening tomorrow then consider.

## 2022-05-17 NOTE — HISTORY OF PRESENT ILLNESS
[FreeTextEntry1] : feeling worse today , achier, more congested, as she is having worsening symptoms, was dx on 5.17 to start Paxlovid. She was told and understands that she will not take her atrovastatin for th e5 days she is on the Paxlovid. Med checked and the rest are not a problem, her gfr is> 80

## 2022-05-19 ENCOUNTER — RX RENEWAL (OUTPATIENT)
Age: 72
End: 2022-05-19

## 2022-05-20 ENCOUNTER — NON-APPOINTMENT (OUTPATIENT)
Age: 72
End: 2022-05-20

## 2022-05-20 NOTE — HISTORY OF PRESENT ILLNESS
[FreeTextEntry1] : doing well, minor congestion only, finished the Paxlovid no further daily f/u needed will call if there are any issues

## 2022-06-02 ENCOUNTER — APPOINTMENT (OUTPATIENT)
Dept: INTERNAL MEDICINE | Facility: CLINIC | Age: 72
End: 2022-06-02
Payer: MEDICARE

## 2022-06-02 VITALS
SYSTOLIC BLOOD PRESSURE: 137 MMHG | HEART RATE: 86 BPM | BODY MASS INDEX: 35.08 KG/M2 | HEIGHT: 63 IN | TEMPERATURE: 97.9 F | OXYGEN SATURATION: 97 % | DIASTOLIC BLOOD PRESSURE: 84 MMHG | WEIGHT: 198 LBS

## 2022-06-02 DIAGNOSIS — U07.1 COVID-19: ICD-10-CM

## 2022-06-02 DIAGNOSIS — M25.562 PAIN IN LEFT KNEE: ICD-10-CM

## 2022-06-02 LAB — HBA1C MFR BLD HPLC: 7.4

## 2022-06-02 PROCEDURE — 99214 OFFICE O/P EST MOD 30 MIN: CPT | Mod: CS,25

## 2022-06-02 PROCEDURE — 83036 HEMOGLOBIN GLYCOSYLATED A1C: CPT | Mod: QW

## 2022-06-02 NOTE — ASSESSMENT
[FreeTextEntry1] : 1. DM : from 7.8 to 7.1and now 7.4,on   recheck today,   wt is  up 5 lbs  lbs. Still a little at night, riley popcorn . Gen is eating out of stress, suggested to try some herbal  tea, decaf drink. On  Januvia and Farxiga . if no improvement then consider some GLP-1 inhibitor ,  started the Today show walking for 30 days \par 2. choles: 150/46/77/ 141 repeat today due \par 3. Breast Ca : per onc, MRI on 2.4.21 repeat in 2/22,  was wnl and mammo in August due ,  followed by MARIA R Rushing. \par 4. Heme:  genetic testing   and still no change and no markers found and lab follow up. \par 5 labs renewed all wnl except a1c/glucose. to repeat \par 6. HCM pna x2 received, shingrix x 2 received, covid x 3 received, mammo in Aug, colon on 2023, optho Aug. Heme routine follow up, gyn in April  done normal dexa and pelvic us \par

## 2022-06-02 NOTE — HISTORY OF PRESENT ILLNESS
[FreeTextEntry1] : Here for follow up for her dm and htn. S/P covid. and feeling totally recovered.  [de-identified] : Here for f/u, her symptoms have resolved , seen by ortho due to left knee pain had injection( cortisone) , has not had any pain and not interested in surgery.

## 2022-06-03 LAB
ALBUMIN SERPL ELPH-MCNC: 4.7 G/DL
ALP BLD-CCNC: 70 U/L
ALT SERPL-CCNC: 27 U/L
ANION GAP SERPL CALC-SCNC: 16 MMOL/L
AST SERPL-CCNC: 25 U/L
BILIRUB SERPL-MCNC: 0.5 MG/DL
BUN SERPL-MCNC: 12 MG/DL
CALCIUM SERPL-MCNC: 9.9 MG/DL
CHLORIDE SERPL-SCNC: 102 MMOL/L
CHOLEST SERPL-MCNC: 185 MG/DL
CO2 SERPL-SCNC: 24 MMOL/L
CREAT SERPL-MCNC: 0.62 MG/DL
EGFR: 95 ML/MIN/1.73M2
GLUCOSE SERPL-MCNC: 107 MG/DL
HDLC SERPL-MCNC: 49 MG/DL
LDLC SERPL CALC-MCNC: 102 MG/DL
NONHDLC SERPL-MCNC: 136 MG/DL
POTASSIUM SERPL-SCNC: 4.4 MMOL/L
PROT SERPL-MCNC: 7 G/DL
SODIUM SERPL-SCNC: 142 MMOL/L
TRIGL SERPL-MCNC: 168 MG/DL

## 2022-06-16 ENCOUNTER — NON-APPOINTMENT (OUTPATIENT)
Age: 72
End: 2022-06-16

## 2022-08-05 ENCOUNTER — APPOINTMENT (OUTPATIENT)
Dept: ULTRASOUND IMAGING | Facility: IMAGING CENTER | Age: 72
End: 2022-08-05

## 2022-08-05 ENCOUNTER — APPOINTMENT (OUTPATIENT)
Dept: MAMMOGRAPHY | Facility: IMAGING CENTER | Age: 72
End: 2022-08-05

## 2022-08-05 ENCOUNTER — OUTPATIENT (OUTPATIENT)
Dept: OUTPATIENT SERVICES | Facility: HOSPITAL | Age: 72
LOS: 1 days | End: 2022-08-05
Payer: MEDICARE

## 2022-08-05 DIAGNOSIS — Z00.8 ENCOUNTER FOR OTHER GENERAL EXAMINATION: ICD-10-CM

## 2022-08-05 PROCEDURE — 76641 ULTRASOUND BREAST COMPLETE: CPT | Mod: 26,50

## 2022-08-05 PROCEDURE — 77067 SCR MAMMO BI INCL CAD: CPT

## 2022-08-05 PROCEDURE — 77067 SCR MAMMO BI INCL CAD: CPT | Mod: 26

## 2022-08-05 PROCEDURE — 76641 ULTRASOUND BREAST COMPLETE: CPT

## 2022-08-05 PROCEDURE — 77063 BREAST TOMOSYNTHESIS BI: CPT | Mod: 26

## 2022-08-05 PROCEDURE — 77063 BREAST TOMOSYNTHESIS BI: CPT

## 2022-08-17 ENCOUNTER — RX RENEWAL (OUTPATIENT)
Age: 72
End: 2022-08-17

## 2022-09-13 ENCOUNTER — APPOINTMENT (OUTPATIENT)
Dept: INTERNAL MEDICINE | Facility: CLINIC | Age: 72
End: 2022-09-13

## 2022-09-13 VITALS
OXYGEN SATURATION: 96 % | RESPIRATION RATE: 15 BRPM | TEMPERATURE: 98.4 F | HEIGHT: 63 IN | HEART RATE: 90 BPM | WEIGHT: 198 LBS | SYSTOLIC BLOOD PRESSURE: 124 MMHG | DIASTOLIC BLOOD PRESSURE: 76 MMHG | BODY MASS INDEX: 35.08 KG/M2

## 2022-09-13 LAB — HBA1C MFR BLD HPLC: 7.7

## 2022-09-13 PROCEDURE — 83036 HEMOGLOBIN GLYCOSYLATED A1C: CPT | Mod: QW

## 2022-09-13 PROCEDURE — 90662 IIV NO PRSV INCREASED AG IM: CPT

## 2022-09-13 PROCEDURE — G0008: CPT

## 2022-09-13 PROCEDURE — 99215 OFFICE O/P EST HI 40 MIN: CPT | Mod: 25

## 2022-09-13 RX ORDER — NIRMATRELVIR AND RITONAVIR 300-100 MG
20 X 150 MG & KIT ORAL
Qty: 1 | Refills: 0 | Status: DISCONTINUED | COMMUNITY
Start: 2022-05-17 | End: 2022-09-13

## 2022-09-13 RX ORDER — SITAGLIPTIN 100 MG/1
100 TABLET, FILM COATED ORAL
Qty: 90 | Refills: 0 | Status: DISCONTINUED | COMMUNITY
Start: 2021-04-22 | End: 2022-09-13

## 2022-09-13 NOTE — PHYSICAL EXAM
[No Masses] : no palpable masses [No Nipple Discharge] : no nipple discharge [Normal] : affect was normal and insight and judgment were intact [de-identified] : loss of volume on the left s/p surgery  [de-identified] : calosities on bilateral feet  [de-identified] : callosities

## 2022-09-13 NOTE — HISTORY OF PRESENT ILLNESS
[FreeTextEntry1] : Here for follow up for full PE , worried about her granddaughter [de-identified] : Has a  list of issues\par 1. to get booster \par 2 vaginal itching resolved\par 3. flu vaccine \par

## 2022-09-13 NOTE — HEALTH RISK ASSESSMENT
[Very Good] : ~his/her~ current health as very good [Excellent] : ~his/her~  mood as  excellent [Never] : Never [No falls in past year] : Patient reported no falls in the past year [0] : 2) Feeling down, depressed, or hopeless: Not at all (0) [[facility]] : 2 [Patient reported mammogram was normal] : Patient reported mammogram was normal [Patient reported colonoscopy was normal] : Patient reported colonoscopy was normal [# of Members in Household ___] :  household currently consist of [unfilled] member(s) [Retired] : retired [Graduate School] : graduate school [] :  [# Of Children ___] : has [unfilled] children [Feels Safe at Home] : Feels safe at home [Fully functional (bathing, dressing, toileting, transferring, walking, feeding)] : Fully functional (bathing, dressing, toileting, transferring, walking, feeding) [Fully functional (using the telephone, shopping, preparing meals, housekeeping, doing laundry, using] : Fully functional and needs no help or supervision to perform IADLs (using the telephone, shopping, preparing meals, housekeeping, doing laundry, using transportation, managing medications and managing finances) [Smoke Detector] : smoke detector [Carbon Monoxide Detector] : carbon monoxide detector [Guns at Home] : no guns at home [Safety elements used in home] : safety elements used in home [Seat Belt] :  uses seat belt [Sunscreen] : uses sunscreen [Travel to Developing Areas] : does not  travel to developing areas [TB Exposure] : is not being exposed to tuberculosis [Caregiver Concerns] : does not have caregiver concerns [MammogramDate] : 8/4/22 [ColonoscopyDate] : 8/18 [Name: ___] : Health Care Proxy's Name: [unfilled]  [Relationship: ___] : Relationship: [unfilled] [AdvancecareDate] : 9/13/22

## 2022-09-13 NOTE — ASSESSMENT
[FreeTextEntry1] : 1. DM : from 7.8 to 7.1and now 7.4, and now 7.7.  on Januvia and Farxiga, may be getting Somogii effect , FS at night 130 and then 160 in the am no nocturnal eating.  May be getting the Somogii effect and will stop the Januvia, as the Farxiga is more helpful with renal, cardiac and wt. Rec to walk daily in this [place] weather, to see Pod tomorrow  \par 2. choles: 150/46/77/ 141 repeat today \par 3. Breast Ca : per onc, MRI on 2.4.21 repeat in 2/22,  was wnl and mammo in August done nad wnl  ,  followed by MARIA R Rushing. \par 4. Heme:  genetic testing   and still no change and no markers found and lab follow up. \par 5 labs renewed all wnl except a1c/glucose. to repeat \par 6. HCM pna x2 received, shingrix x 2 received, covid x 3 received, mammo in Aug done , colon in 2023, optho Aug. Heme routine follow up, gyn in April  done normal dexa and pelvic us , Flu HD today booster planning , med renewals \par

## 2022-09-14 LAB
ALBUMIN SERPL ELPH-MCNC: 4.6 G/DL
ALP BLD-CCNC: 70 U/L
ALT SERPL-CCNC: 21 U/L
ANION GAP SERPL CALC-SCNC: 13 MMOL/L
AST SERPL-CCNC: 21 U/L
BASOPHILS # BLD AUTO: 0.04 K/UL
BASOPHILS NFR BLD AUTO: 0.8 %
BILIRUB SERPL-MCNC: 0.5 MG/DL
BUN SERPL-MCNC: 14 MG/DL
CALCIUM SERPL-MCNC: 9.5 MG/DL
CHLORIDE SERPL-SCNC: 103 MMOL/L
CHOLEST SERPL-MCNC: 172 MG/DL
CO2 SERPL-SCNC: 22 MMOL/L
CREAT SERPL-MCNC: 0.7 MG/DL
CREAT SPEC-SCNC: 41 MG/DL
EGFR: 92 ML/MIN/1.73M2
EOSINOPHIL # BLD AUTO: 0.11 K/UL
EOSINOPHIL NFR BLD AUTO: 2.2 %
ESTIMATED AVERAGE GLUCOSE: 183 MG/DL
GLUCOSE SERPL-MCNC: 175 MG/DL
HBA1C MFR BLD HPLC: 8 %
HCT VFR BLD CALC: 49.8 %
HDLC SERPL-MCNC: 53 MG/DL
HGB BLD-MCNC: 16.1 G/DL
IMM GRANULOCYTES NFR BLD AUTO: 0.4 %
LDLC SERPL CALC-MCNC: 94 MG/DL
LYMPHOCYTES # BLD AUTO: 1.72 K/UL
LYMPHOCYTES NFR BLD AUTO: 33.8 %
MAN DIFF?: NORMAL
MCHC RBC-ENTMCNC: 29.2 PG
MCHC RBC-ENTMCNC: 32.3 GM/DL
MCV RBC AUTO: 90.4 FL
MICROALBUMIN 24H UR DL<=1MG/L-MCNC: <1.2 MG/DL
MICROALBUMIN/CREAT 24H UR-RTO: NORMAL MG/G
MONOCYTES # BLD AUTO: 0.46 K/UL
MONOCYTES NFR BLD AUTO: 9 %
NEUTROPHILS # BLD AUTO: 2.74 K/UL
NEUTROPHILS NFR BLD AUTO: 53.8 %
NONHDLC SERPL-MCNC: 119 MG/DL
PLATELET # BLD AUTO: 158 K/UL
POTASSIUM SERPL-SCNC: 4.5 MMOL/L
PROT SERPL-MCNC: 6.8 G/DL
RBC # BLD: 5.51 M/UL
RBC # FLD: 13.6 %
SODIUM SERPL-SCNC: 139 MMOL/L
TRIGL SERPL-MCNC: 121 MG/DL
TSH SERPL-ACNC: 4.36 UIU/ML
WBC # FLD AUTO: 5.09 K/UL

## 2022-11-21 ENCOUNTER — APPOINTMENT (OUTPATIENT)
Dept: SURGERY | Facility: CLINIC | Age: 72
End: 2022-11-21

## 2022-11-21 PROCEDURE — 99213K: CUSTOM

## 2022-12-12 ENCOUNTER — NON-APPOINTMENT (OUTPATIENT)
Age: 72
End: 2022-12-12

## 2022-12-13 ENCOUNTER — APPOINTMENT (OUTPATIENT)
Dept: INTERNAL MEDICINE | Facility: CLINIC | Age: 72
End: 2022-12-13

## 2022-12-13 VITALS
TEMPERATURE: 97.9 F | WEIGHT: 198 LBS | SYSTOLIC BLOOD PRESSURE: 131 MMHG | BODY MASS INDEX: 35.08 KG/M2 | DIASTOLIC BLOOD PRESSURE: 79 MMHG | HEIGHT: 63 IN | OXYGEN SATURATION: 97 % | HEART RATE: 83 BPM

## 2022-12-13 PROCEDURE — 99214 OFFICE O/P EST MOD 30 MIN: CPT

## 2022-12-13 NOTE — ASSESSMENT
[FreeTextEntry1] : 1. DM : from 7.8 to 7.1and now 7.4, and now 7.7.  on Januvia and Farxiga,unlikely to be somogii as her FS nelia off the Januvia so will restart today . Rec to walk daily in this [place] weather, getting a treadmill for xmas \par 2. choles: 150/46/77/ 141 repeat  was 172/53/94/121  \par 3. Breast Ca : per onc, MRII repeat in 2/23,  was wnl and mammo in August done nad wnl  ,  followed by MARIA R Rushing. \par 4. Heme:  genetic testing   and still no change and no markers found and lab follow up. \par 5 labs renewed all wnl except a1c/glucose. to repeat \par 6. HCM pna x2 received, shingrix x 2 received, covid x 3 received, mammo in Aug done , colon in 2023, optho done in 10/22, . Heme routine follow up, gyn in April  done normal dexa and pelvic us , Flu HD  booster done med renewals \par

## 2022-12-13 NOTE — HISTORY OF PRESENT ILLNESS
[FreeTextEntry1] : Here for follow up for her dm and htn.  [de-identified] : Has a  list of issues\par 1. received  flu and covid  booster \par 2. off the Januvia her FS are rising again \par \par

## 2023-02-08 ENCOUNTER — APPOINTMENT (OUTPATIENT)
Dept: MRI IMAGING | Facility: IMAGING CENTER | Age: 73
End: 2023-02-08
Payer: MEDICARE

## 2023-02-08 ENCOUNTER — OUTPATIENT (OUTPATIENT)
Dept: OUTPATIENT SERVICES | Facility: HOSPITAL | Age: 73
LOS: 1 days | End: 2023-02-08
Payer: MEDICARE

## 2023-02-08 DIAGNOSIS — Z00.8 ENCOUNTER FOR OTHER GENERAL EXAMINATION: ICD-10-CM

## 2023-02-08 PROCEDURE — C8908: CPT | Mod: MH

## 2023-02-08 PROCEDURE — A9585: CPT

## 2023-02-08 PROCEDURE — C8937: CPT

## 2023-02-08 PROCEDURE — 77049 MRI BREAST C-+ W/CAD BI: CPT | Mod: 26,MH

## 2023-02-23 ENCOUNTER — TRANSCRIPTION ENCOUNTER (OUTPATIENT)
Age: 73
End: 2023-02-23

## 2023-03-23 ENCOUNTER — RX RENEWAL (OUTPATIENT)
Age: 73
End: 2023-03-23

## 2023-04-05 ENCOUNTER — OFFICE (OUTPATIENT)
Dept: URBAN - METROPOLITAN AREA CLINIC 90 | Facility: CLINIC | Age: 73
Setting detail: OPHTHALMOLOGY
End: 2023-04-05
Payer: MEDICARE

## 2023-04-05 DIAGNOSIS — H43.813: ICD-10-CM

## 2023-04-05 DIAGNOSIS — H02.831: ICD-10-CM

## 2023-04-05 DIAGNOSIS — H40.013: ICD-10-CM

## 2023-04-05 DIAGNOSIS — H16.422: ICD-10-CM

## 2023-04-05 DIAGNOSIS — H02.834: ICD-10-CM

## 2023-04-05 DIAGNOSIS — H25.13: ICD-10-CM

## 2023-04-05 DIAGNOSIS — E11.9: ICD-10-CM

## 2023-04-05 DIAGNOSIS — H43.393: ICD-10-CM

## 2023-04-05 DIAGNOSIS — H35.371: ICD-10-CM

## 2023-04-05 DIAGNOSIS — H35.40: ICD-10-CM

## 2023-04-05 PROBLEM — H16.423 CORNEAL PANNUS; BOTH EYES: Status: ACTIVE | Noted: 2023-04-05

## 2023-04-05 PROCEDURE — 92250 FUNDUS PHOTOGRAPHY W/I&R: CPT | Performed by: OPHTHALMOLOGY

## 2023-04-05 PROCEDURE — 92014 COMPRE OPH EXAM EST PT 1/>: CPT | Performed by: OPHTHALMOLOGY

## 2023-04-05 ASSESSMENT — REFRACTION_MANIFEST
OS_VA1: 20/20-1
OD_SPHERE: +1.75
OD_CYLINDER: -0.75
OS_CYLINDER: -1.75
OD_VA2: 20/20
OD_SPHERE: -0.25
OD_ADD: +2.50
OD_AXIS: 090
OS_ADD: +2.50
OS_VA1: 20/20-2
OS_SPHERE: -1.25
OD_CYLINDER: -0.75
OS_ADD: +2.75
OD_ADD: +2.50
OD_AXIS: 090
OD_VA1: 20/20-1
OS_CYLINDER: -1.75
OD_VA1: 20/20-1
OD_VA2: 20/20
OS_CYLINDER: -1.75
OS_AXIS: 78
OD_SPHERE: -0.75
OD_VA1: 20/25-1
OS_VA2: 20/20
OS_AXIS: 080
OS_ADD: +2.50
OS_AXIS: 078
OS_VA2: 20/20
OD_AXIS: 90
OD_VA2: 20/20
OS_SPHERE: -0.75
OS_SPHERE: +1.00
OD_CYLINDER: -0.75
OD_CYLINDER: -2.25
OD_SPHERE: -0.75
OS_AXIS: 080
OD_AXIS: 105
OS_CYLINDER: -2.50
OS_SPHERE: -1.25
OD_ADD: +2.75
OS_VA2: 20/20
OS_VA1: 20/20-1

## 2023-04-05 ASSESSMENT — PACHYMETRY
OD_CT_CORRECTION: -3
OS_CT_CORRECTION: -4
OD_CT_UM: 583
OS_CT_UM: 595

## 2023-04-05 ASSESSMENT — SPHEQUIV_DERIVED
OS_SPHEQUIV: -1.125
OD_SPHEQUIV: 1.375
OD_SPHEQUIV: -1.375
OD_SPHEQUIV: -1.125
OS_SPHEQUIV: -2.125
OD_SPHEQUIV: -1.125
OS_SPHEQUIV: 0.125
OS_SPHEQUIV: -2.125
OD_SPHEQUIV: -1.125
OS_SPHEQUIV: -2

## 2023-04-05 ASSESSMENT — REFRACTION_CURRENTRX
OS_CYLINDER: -1.50
OD_AXIS: 095
OD_AXIS: 098
OD_CYLINDER: -1.25
OD_AXIS: 101
OD_ADD: +2.50
OS_CYLINDER: -1.75
OS_SPHERE: -1.75
OS_ADD: +2.50
OS_ADD: +2.50
OD_CYLINDER: -1.25
OS_CYLINDER: -1.75
OS_SPHERE: -1.75
OD_SPHERE: -1.25
OD_ADD: +2.50
OS_SPHERE: -1.75
OS_ADD: +2.50
OS_SPHERE: -1.75
OS_CYLINDER: -1.75
OD_SPHERE: -1.25
OD_VPRISM_DIRECTION: PROGS
OS_SPHERE: -1.50
OS_AXIS: 078
OS_AXIS: 085
OD_ADD: +2.00
OS_CYLINDER: -1.75
OD_OVR_VA: 20/
OS_OVR_VA: 20/
OS_AXIS: 080
OD_CYLINDER: -1.25
OS_OVR_VA: 20/
OD_ADD: +2.50
OD_OVR_VA: 20/
OD_SPHERE: -1.25
OS_AXIS: 074
OD_VPRISM_DIRECTION: PROGS
OS_AXIS: 083
OD_OVR_VA: 20/
OD_AXIS: 085
OS_ADD: +2.50
OD_ADD: +2.50
OD_CYLINDER: -1.25
OS_VPRISM_DIRECTION: PROGS
OD_SPHERE: -0.75
OD_AXIS: 088
OS_OVR_VA: 20/
OS_ADD: +2.00
OS_VPRISM_DIRECTION: PROGS
OD_CYLINDER: -1.25
OD_VPRISM_DIRECTION: PROGS
OS_VPRISM_DIRECTION: PROGS
OS_VPRISM_DIRECTION: PROGS
OD_SPHERE: -1.00
OD_VPRISM_DIRECTION: PROGS

## 2023-04-05 ASSESSMENT — REFRACTION_AUTOREFRACTION
OS_SPHERE: -0.50
OD_AXIS: 100
OD_SPHERE: -0.50
OS_AXIS: 097
OD_CYLINDER: -1.25
OS_CYLINDER: -1.25

## 2023-04-05 ASSESSMENT — KERATOMETRY
OS_K2POWER_DIOPTERS: 42.75
METHOD_AUTO_MANUAL: AUTO
OD_AXISANGLE_DEGREES: 055
OS_AXISANGLE_DEGREES: 079
OD_K1POWER_DIOPTERS: 41.75
OD_K2POWER_DIOPTERS: 42.25
OS_K1POWER_DIOPTERS: 42.00

## 2023-04-05 ASSESSMENT — AXIALLENGTH_DERIVED
OS_AL: 24.902
OD_AL: 24.6245
OD_AL: 24.6245
OS_AL: 24.8478
OD_AL: 24.731
OS_AL: 24.902
OD_AL: 23.6072
OD_AL: 24.6245
OS_AL: 24.4753
OS_AL: 23.9621

## 2023-04-05 ASSESSMENT — TONOMETRY
OD_IOP_MMHG: 16
OS_IOP_MMHG: 16

## 2023-04-05 ASSESSMENT — CONFRONTATIONAL VISUAL FIELD TEST (CVF)
OD_FINDINGS: FULL
OS_FINDINGS: FULL

## 2023-04-05 ASSESSMENT — LID EXAM ASSESSMENTS
OS_COMMENTS: TELANGIECTATIC LID MARGINS
OD_COMMENTS: TELANGIECTATIC LID MARGINS

## 2023-04-05 ASSESSMENT — VASCULARIZATION: OS_VASCULARIZATION: NASAL PANNUS

## 2023-04-05 ASSESSMENT — LID POSITION - DERMATOCHALASIS
OS_DERMATOCHALASIS: LUL T
OD_DERMATOCHALASIS: RUL T

## 2023-04-05 ASSESSMENT — VISUAL ACUITY
OS_BCVA: 20/25+2
OD_BCVA: 20/25-2

## 2023-04-11 ENCOUNTER — APPOINTMENT (OUTPATIENT)
Dept: INTERNAL MEDICINE | Facility: CLINIC | Age: 73
End: 2023-04-11

## 2023-05-03 ENCOUNTER — APPOINTMENT (OUTPATIENT)
Dept: INTERNAL MEDICINE | Facility: CLINIC | Age: 73
End: 2023-05-03
Payer: MEDICARE

## 2023-05-03 VITALS
HEIGHT: 63 IN | SYSTOLIC BLOOD PRESSURE: 156 MMHG | TEMPERATURE: 97.6 F | WEIGHT: 198 LBS | BODY MASS INDEX: 35.08 KG/M2 | DIASTOLIC BLOOD PRESSURE: 85 MMHG | OXYGEN SATURATION: 97 % | HEART RATE: 96 BPM

## 2023-05-03 PROCEDURE — 99214 OFFICE O/P EST MOD 30 MIN: CPT

## 2023-05-04 ENCOUNTER — TRANSCRIPTION ENCOUNTER (OUTPATIENT)
Age: 73
End: 2023-05-04

## 2023-05-04 VITALS — SYSTOLIC BLOOD PRESSURE: 125 MMHG | DIASTOLIC BLOOD PRESSURE: 80 MMHG

## 2023-05-04 LAB
ALBUMIN SERPL ELPH-MCNC: 4.8 G/DL
ALP BLD-CCNC: 78 U/L
ALT SERPL-CCNC: 24 U/L
ANION GAP SERPL CALC-SCNC: 17 MMOL/L
AST SERPL-CCNC: 25 U/L
BASOPHILS # BLD AUTO: 0.03 K/UL
BASOPHILS NFR BLD AUTO: 0.5 %
BILIRUB SERPL-MCNC: 0.6 MG/DL
BUN SERPL-MCNC: 13 MG/DL
CALCIUM SERPL-MCNC: 10.4 MG/DL
CHLORIDE SERPL-SCNC: 102 MMOL/L
CHOLEST SERPL-MCNC: 177 MG/DL
CO2 SERPL-SCNC: 24 MMOL/L
CREAT SERPL-MCNC: 0.63 MG/DL
EGFR: 94 ML/MIN/1.73M2
EOSINOPHIL # BLD AUTO: 0.08 K/UL
EOSINOPHIL NFR BLD AUTO: 1.4 %
ESTIMATED AVERAGE GLUCOSE: 192 MG/DL
GLUCOSE SERPL-MCNC: 138 MG/DL
HBA1C MFR BLD HPLC: 8.3 %
HCT VFR BLD CALC: 51.6 %
HDLC SERPL-MCNC: 50 MG/DL
HGB BLD-MCNC: 16 G/DL
IMM GRANULOCYTES NFR BLD AUTO: 0.3 %
LDLC SERPL CALC-MCNC: 95 MG/DL
LYMPHOCYTES # BLD AUTO: 1.94 K/UL
LYMPHOCYTES NFR BLD AUTO: 33.8 %
MAN DIFF?: NORMAL
MCHC RBC-ENTMCNC: 28.6 PG
MCHC RBC-ENTMCNC: 31 GM/DL
MCV RBC AUTO: 92.3 FL
MONOCYTES # BLD AUTO: 0.51 K/UL
MONOCYTES NFR BLD AUTO: 8.9 %
NEUTROPHILS # BLD AUTO: 3.16 K/UL
NEUTROPHILS NFR BLD AUTO: 55.1 %
NONHDLC SERPL-MCNC: 126 MG/DL
PLATELET # BLD AUTO: 181 K/UL
POTASSIUM SERPL-SCNC: 4.8 MMOL/L
PROT SERPL-MCNC: 7.4 G/DL
RBC # BLD: 5.59 M/UL
RBC # FLD: 13.6 %
SODIUM SERPL-SCNC: 143 MMOL/L
TRIGL SERPL-MCNC: 155 MG/DL
TSH SERPL-ACNC: 2.2 UIU/ML
WBC # FLD AUTO: 5.74 K/UL

## 2023-05-04 NOTE — ASSESSMENT
[FreeTextEntry1] : 72 Y/O female here for f/u\par \par Labs: CBC, CMP, A1C, lipids, TSH/reflex\par \par \par DM:  Labs today\par A1c in Dec 7.7\par Farxiga 10\par Januvia 100\par \par HLD:\par Atorvastatin 10\par LDL at Goal in Sept 22, repeat today\par \par \par HTN: repeat normal today\par Losartan 25\par \par Hx of Breast CA:  \par Breast MR Feb 2023\par Mammo/SOno in August 2023\par \par CLS to be done this summer\par \par \par

## 2023-05-04 NOTE — HISTORY OF PRESENT ILLNESS
[de-identified] : 72 y/o female, patient of Dr. Desai's here for f/u\par Hx of DM AND htn\par \par Was seen last in Dec 22\par Back on Januvia after a rise in sugars

## 2023-05-04 NOTE — PHYSICAL EXAM
[No Acute Distress] : no acute distress [Normal Sclera/Conjunctiva] : normal sclera/conjunctiva [No Respiratory Distress] : no respiratory distress  [Clear to Auscultation] : lungs were clear to auscultation bilaterally [Normal Rate] : normal rate  [Regular Rhythm] : with a regular rhythm

## 2023-05-05 ENCOUNTER — TRANSCRIPTION ENCOUNTER (OUTPATIENT)
Age: 73
End: 2023-05-05

## 2023-05-05 DIAGNOSIS — D75.1 SECONDARY POLYCYTHEMIA: ICD-10-CM

## 2023-05-06 ENCOUNTER — TRANSCRIPTION ENCOUNTER (OUTPATIENT)
Age: 73
End: 2023-05-06

## 2023-05-06 LAB
FERRITIN SERPL-MCNC: 181 NG/ML
IRON SATN MFR SERPL: 28 %
IRON SERPL-MCNC: 99 UG/DL
TIBC SERPL-MCNC: 352 UG/DL
UIBC SERPL-MCNC: 253 UG/DL

## 2023-05-08 ENCOUNTER — TRANSCRIPTION ENCOUNTER (OUTPATIENT)
Age: 73
End: 2023-05-08

## 2023-06-21 ENCOUNTER — TRANSCRIPTION ENCOUNTER (OUTPATIENT)
Age: 73
End: 2023-06-21

## 2023-06-22 ENCOUNTER — TRANSCRIPTION ENCOUNTER (OUTPATIENT)
Age: 73
End: 2023-06-22

## 2023-06-29 ENCOUNTER — TRANSCRIPTION ENCOUNTER (OUTPATIENT)
Age: 73
End: 2023-06-29

## 2023-07-03 ENCOUNTER — TRANSCRIPTION ENCOUNTER (OUTPATIENT)
Age: 73
End: 2023-07-03

## 2023-08-15 ENCOUNTER — OUTPATIENT (OUTPATIENT)
Dept: OUTPATIENT SERVICES | Facility: HOSPITAL | Age: 73
LOS: 1 days | End: 2023-08-15
Payer: MEDICARE

## 2023-08-15 ENCOUNTER — APPOINTMENT (OUTPATIENT)
Dept: MAMMOGRAPHY | Facility: IMAGING CENTER | Age: 73
End: 2023-08-15
Payer: MEDICARE

## 2023-08-15 ENCOUNTER — APPOINTMENT (OUTPATIENT)
Dept: ULTRASOUND IMAGING | Facility: IMAGING CENTER | Age: 73
End: 2023-08-15
Payer: MEDICARE

## 2023-08-15 DIAGNOSIS — Z00.8 ENCOUNTER FOR OTHER GENERAL EXAMINATION: ICD-10-CM

## 2023-08-15 PROCEDURE — 76641 ULTRASOUND BREAST COMPLETE: CPT | Mod: 26,50,GY

## 2023-08-15 PROCEDURE — 77067 SCR MAMMO BI INCL CAD: CPT | Mod: 26

## 2023-08-15 PROCEDURE — 77063 BREAST TOMOSYNTHESIS BI: CPT | Mod: 26

## 2023-08-15 PROCEDURE — 77063 BREAST TOMOSYNTHESIS BI: CPT

## 2023-08-15 PROCEDURE — 76641 ULTRASOUND BREAST COMPLETE: CPT

## 2023-08-15 PROCEDURE — 77067 SCR MAMMO BI INCL CAD: CPT

## 2023-10-18 ENCOUNTER — OFFICE (OUTPATIENT)
Dept: URBAN - METROPOLITAN AREA CLINIC 90 | Facility: CLINIC | Age: 73
Setting detail: OPHTHALMOLOGY
End: 2023-10-18
Payer: MEDICARE

## 2023-10-18 DIAGNOSIS — H16.422: ICD-10-CM

## 2023-10-18 DIAGNOSIS — H25.13: ICD-10-CM

## 2023-10-18 DIAGNOSIS — H35.371: ICD-10-CM

## 2023-10-18 DIAGNOSIS — H43.393: ICD-10-CM

## 2023-10-18 DIAGNOSIS — H40.013: ICD-10-CM

## 2023-10-18 DIAGNOSIS — E11.9: ICD-10-CM

## 2023-10-18 DIAGNOSIS — H02.831: ICD-10-CM

## 2023-10-18 DIAGNOSIS — H02.834: ICD-10-CM

## 2023-10-18 DIAGNOSIS — H43.813: ICD-10-CM

## 2023-10-18 DIAGNOSIS — H35.40: ICD-10-CM

## 2023-10-18 PROCEDURE — 92014 COMPRE OPH EXAM EST PT 1/>: CPT | Performed by: OPHTHALMOLOGY

## 2023-10-18 PROCEDURE — 92133 CPTRZD OPH DX IMG PST SGM ON: CPT | Performed by: OPHTHALMOLOGY

## 2023-10-18 PROCEDURE — 92083 EXTENDED VISUAL FIELD XM: CPT | Performed by: OPHTHALMOLOGY

## 2023-10-18 ASSESSMENT — REFRACTION_CURRENTRX
OS_ADD: +2.50
OD_ADD: +2.00
OD_AXIS: 088
OS_AXIS: 078
OD_VPRISM_DIRECTION: PROGS
OS_CYLINDER: -1.50
OS_CYLINDER: -1.75
OD_OVR_VA: 20/
OS_SPHERE: -1.75
OD_ADD: +2.50
OS_VPRISM_DIRECTION: PROGS
OD_VPRISM_DIRECTION: PROGS
OD_ADD: +2.50
OS_VPRISM_DIRECTION: PROGS
OS_AXIS: 076
OD_ADD: +2.50
OS_AXIS: 074
OS_CYLINDER: -1.75
OD_SPHERE: -1.25
OD_AXIS: 095
OS_AXIS: 083
OS_ADD: +2.50
OD_OVR_VA: 20/
OD_SPHERE: -1.25
OS_OVR_VA: 20/
OS_AXIS: 080
OS_SPHERE: -1.75
OD_VPRISM_DIRECTION: PROGS
OS_OVR_VA: 20/
OD_CYLINDER: -1.25
OS_SPHERE: -1.75
OD_CYLINDER: -1.25
OD_CYLINDER: -1.25
OS_OVR_VA: 20/
OD_SPHERE: -0.75
OS_ADD: +2.50
OS_CYLINDER: -1.75
OD_VPRISM_DIRECTION: PROGS
OD_SPHERE: -1.25
OS_AXIS: 085
OS_CYLINDER: -1.50
OD_ADD: +2.50
OD_SPHERE: -0.75
OD_AXIS: 101
OD_OVR_VA: 20/
OS_VPRISM_DIRECTION: PROGS
OD_CYLINDER: -1.25
OS_SPHERE: -1.50
OS_ADD: +2.50
OS_CYLINDER: -1.75
OD_VPRISM_DIRECTION: PROGS
OD_AXIS: 085
OD_AXIS: 098
OD_SPHERE: -1.00
OS_VPRISM_DIRECTION: PROGS
OD_CYLINDER: -1.25
OS_ADD: +2.00
OD_AXIS: 087
OD_CYLINDER: -1.25
OS_VPRISM_DIRECTION: PROGS
OD_ADD: +2.25
OS_SPHERE: -1.75
OS_ADD: +2.25
OS_SPHERE: -1.75

## 2023-10-18 ASSESSMENT — SPHEQUIV_DERIVED
OD_SPHEQUIV: 1.375
OS_SPHEQUIV: 0.125
OS_SPHEQUIV: -1.25
OS_SPHEQUIV: -2.125
OD_SPHEQUIV: -1.125
OD_SPHEQUIV: -1.125
OS_SPHEQUIV: -2.125
OD_SPHEQUIV: -1.375
OD_SPHEQUIV: -1.125
OS_SPHEQUIV: -2

## 2023-10-18 ASSESSMENT — REFRACTION_MANIFEST
OS_AXIS: 78
OD_ADD: +2.75
OS_CYLINDER: -2.50
OD_VA1: 20/20-1
OS_SPHERE: -1.25
OS_VA2: 20/20
OD_AXIS: 105
OD_ADD: +2.50
OS_SPHERE: -0.75
OD_VA2: 20/20
OS_CYLINDER: -1.75
OS_VA1: 20/20-1
OS_CYLINDER: -1.75
OD_SPHERE: -0.75
OD_AXIS: 090
OS_VA2: 20/20
OS_ADD: +2.50
OD_SPHERE: -0.75
OS_VA1: 20/20-2
OD_ADD: +2.50
OS_SPHERE: -1.25
OD_CYLINDER: -2.25
OD_CYLINDER: -0.75
OS_SPHERE: +1.00
OS_ADD: +2.50
OD_CYLINDER: -0.75
OD_AXIS: 090
OS_VA1: 20/20-1
OD_SPHERE: +1.75
OD_VA1: 20/25-1
OS_AXIS: 080
OD_AXIS: 90
OD_VA2: 20/20
OD_VA1: 20/20-1
OD_VA2: 20/20
OS_CYLINDER: -1.75
OD_CYLINDER: -0.75
OD_SPHERE: -0.25
OS_AXIS: 080
OS_VA2: 20/20
OS_AXIS: 078
OS_ADD: +2.75

## 2023-10-18 ASSESSMENT — VASCULARIZATION: OS_VASCULARIZATION: NASAL PANNUS

## 2023-10-18 ASSESSMENT — LID POSITION - DERMATOCHALASIS
OS_DERMATOCHALASIS: LUL T
OD_DERMATOCHALASIS: RUL T

## 2023-10-18 ASSESSMENT — REFRACTION_AUTOREFRACTION
OD_SPHERE: -0.25
OD_CYLINDER: -1.75
OS_SPHERE: -0.50
OD_AXIS: 096
OS_CYLINDER: -1.50
OS_AXIS: 089

## 2023-10-18 ASSESSMENT — KERATOMETRY
OS_K1POWER_DIOPTERS: 42.00
OS_K2POWER_DIOPTERS: 42.25
OD_AXISANGLE_DEGREES: 035
OS_AXISANGLE_DEGREES: 092
OD_K2POWER_DIOPTERS: 42.25
METHOD_AUTO_MANUAL: AUTO
OD_K1POWER_DIOPTERS: 41.75

## 2023-10-18 ASSESSMENT — TONOMETRY
OD_IOP_MMHG: 14
OS_IOP_MMHG: 14

## 2023-10-18 ASSESSMENT — PACHYMETRY
OD_CT_UM: 583
OS_CT_UM: 595
OD_CT_CORRECTION: -3
OS_CT_CORRECTION: -4

## 2023-10-18 ASSESSMENT — AXIALLENGTH_DERIVED
OS_AL: 24.6275
OS_AL: 25.0047
OS_AL: 24.0573
OD_AL: 24.6245
OD_AL: 23.6072
OS_AL: 25.0047
OD_AL: 24.731
OS_AL: 24.9501
OD_AL: 24.6245
OD_AL: 24.6245

## 2023-10-18 ASSESSMENT — LID EXAM ASSESSMENTS
OS_COMMENTS: TELANGIECTATIC LID MARGINS
OD_COMMENTS: TELANGIECTATIC LID MARGINS

## 2023-10-18 ASSESSMENT — VISUAL ACUITY
OS_BCVA: 20/25+2
OD_BCVA: 20/25

## 2023-10-18 ASSESSMENT — CONFRONTATIONAL VISUAL FIELD TEST (CVF)
OS_FINDINGS: FULL
OD_FINDINGS: FULL

## 2023-10-22 ENCOUNTER — RX RENEWAL (OUTPATIENT)
Age: 73
End: 2023-10-22

## 2023-10-22 RX ORDER — BLOOD SUGAR DIAGNOSTIC
STRIP MISCELLANEOUS
Qty: 100 | Refills: 3 | Status: ACTIVE | COMMUNITY
Start: 2020-07-21 | End: 1900-01-01

## 2023-11-06 ENCOUNTER — APPOINTMENT (OUTPATIENT)
Dept: SURGERY | Facility: CLINIC | Age: 73
End: 2023-11-06
Payer: MEDICARE

## 2023-11-06 PROCEDURE — 99213K: CUSTOM

## 2023-11-28 ENCOUNTER — APPOINTMENT (OUTPATIENT)
Dept: INTERNAL MEDICINE | Facility: CLINIC | Age: 73
End: 2023-11-28
Payer: MEDICARE

## 2023-11-28 VITALS
DIASTOLIC BLOOD PRESSURE: 92 MMHG | TEMPERATURE: 98 F | SYSTOLIC BLOOD PRESSURE: 147 MMHG | WEIGHT: 192 LBS | HEIGHT: 63 IN | BODY MASS INDEX: 34.02 KG/M2 | OXYGEN SATURATION: 95 % | HEART RATE: 93 BPM

## 2023-11-28 LAB — HBA1C MFR BLD HPLC: 8.7

## 2023-11-28 PROCEDURE — 83036 HEMOGLOBIN GLYCOSYLATED A1C: CPT | Mod: QW

## 2023-11-28 PROCEDURE — 99214 OFFICE O/P EST MOD 30 MIN: CPT | Mod: 25

## 2023-11-28 RX ORDER — SITAGLIPTIN 100 MG/1
100 TABLET, FILM COATED ORAL DAILY
Qty: 90 | Refills: 3 | Status: DISCONTINUED | COMMUNITY
Start: 2022-12-13 | End: 2023-11-28

## 2023-11-29 LAB
ALBUMIN SERPL ELPH-MCNC: 4.7 G/DL
ALP BLD-CCNC: 84 U/L
ALT SERPL-CCNC: 26 U/L
ANION GAP SERPL CALC-SCNC: 14 MMOL/L
AST SERPL-CCNC: 27 U/L
BILIRUB SERPL-MCNC: 0.6 MG/DL
BUN SERPL-MCNC: 13 MG/DL
CALCIUM SERPL-MCNC: 10.2 MG/DL
CHLORIDE SERPL-SCNC: 101 MMOL/L
CHOLEST SERPL-MCNC: 159 MG/DL
CO2 SERPL-SCNC: 24 MMOL/L
CREAT SERPL-MCNC: 0.69 MG/DL
EGFR: 92 ML/MIN/1.73M2
GLUCOSE SERPL-MCNC: 113 MG/DL
HDLC SERPL-MCNC: 51 MG/DL
LDLC SERPL CALC-MCNC: 86 MG/DL
NONHDLC SERPL-MCNC: 107 MG/DL
POTASSIUM SERPL-SCNC: 4.7 MMOL/L
PROT SERPL-MCNC: 7 G/DL
SODIUM SERPL-SCNC: 140 MMOL/L
TRIGL SERPL-MCNC: 119 MG/DL

## 2024-01-04 ENCOUNTER — TRANSCRIPTION ENCOUNTER (OUTPATIENT)
Age: 74
End: 2024-01-04

## 2024-02-12 ENCOUNTER — OUTPATIENT (OUTPATIENT)
Dept: OUTPATIENT SERVICES | Facility: HOSPITAL | Age: 74
LOS: 1 days | End: 2024-02-12
Payer: MEDICARE

## 2024-02-12 ENCOUNTER — APPOINTMENT (OUTPATIENT)
Dept: MRI IMAGING | Facility: IMAGING CENTER | Age: 74
End: 2024-02-12
Payer: MEDICARE

## 2024-02-12 DIAGNOSIS — Z00.8 ENCOUNTER FOR OTHER GENERAL EXAMINATION: ICD-10-CM

## 2024-02-12 PROCEDURE — C8908: CPT | Mod: MH

## 2024-02-12 PROCEDURE — C8937: CPT

## 2024-02-12 PROCEDURE — 77049 MRI BREAST C-+ W/CAD BI: CPT | Mod: 26,MH

## 2024-02-26 ENCOUNTER — OUTPATIENT (OUTPATIENT)
Dept: OUTPATIENT SERVICES | Facility: HOSPITAL | Age: 74
LOS: 1 days | End: 2024-02-26
Payer: MEDICARE

## 2024-02-26 ENCOUNTER — APPOINTMENT (OUTPATIENT)
Dept: MRI IMAGING | Facility: IMAGING CENTER | Age: 74
End: 2024-02-26
Payer: MEDICARE

## 2024-02-26 DIAGNOSIS — Z00.8 ENCOUNTER FOR OTHER GENERAL EXAMINATION: ICD-10-CM

## 2024-02-26 PROCEDURE — 88342 IMHCHEM/IMCYTCHM 1ST ANTB: CPT | Mod: 26

## 2024-02-26 PROCEDURE — A4648: CPT

## 2024-02-26 PROCEDURE — 77065 DX MAMMO INCL CAD UNI: CPT

## 2024-02-26 PROCEDURE — 88341 IMHCHEM/IMCYTCHM EA ADD ANTB: CPT

## 2024-02-26 PROCEDURE — 88305 TISSUE EXAM BY PATHOLOGIST: CPT | Mod: 26

## 2024-02-26 PROCEDURE — 88341 IMHCHEM/IMCYTCHM EA ADD ANTB: CPT | Mod: 26

## 2024-02-26 PROCEDURE — 88342 IMHCHEM/IMCYTCHM 1ST ANTB: CPT | Mod: XU

## 2024-02-26 PROCEDURE — 88305 TISSUE EXAM BY PATHOLOGIST: CPT

## 2024-02-26 PROCEDURE — 88360 TUMOR IMMUNOHISTOCHEM/MANUAL: CPT

## 2024-02-26 PROCEDURE — 19085 BX BREAST 1ST LESION MR IMAG: CPT | Mod: RT

## 2024-02-26 PROCEDURE — 77065 DX MAMMO INCL CAD UNI: CPT | Mod: 26,RT

## 2024-02-26 PROCEDURE — 19085 BX BREAST 1ST LESION MR IMAG: CPT

## 2024-02-26 PROCEDURE — A9585: CPT

## 2024-03-01 ENCOUNTER — TRANSCRIPTION ENCOUNTER (OUTPATIENT)
Age: 74
End: 2024-03-01

## 2024-03-01 LAB — SURGICAL PATHOLOGY STUDY: SIGNIFICANT CHANGE UP

## 2024-03-07 ENCOUNTER — APPOINTMENT (OUTPATIENT)
Dept: INTERNAL MEDICINE | Facility: CLINIC | Age: 74
End: 2024-03-07
Payer: MEDICARE

## 2024-03-07 VITALS
HEART RATE: 96 BPM | TEMPERATURE: 98.5 F | BODY MASS INDEX: 30.83 KG/M2 | SYSTOLIC BLOOD PRESSURE: 111 MMHG | HEIGHT: 63 IN | DIASTOLIC BLOOD PRESSURE: 72 MMHG | WEIGHT: 174 LBS

## 2024-03-07 DIAGNOSIS — R52 PAIN, UNSPECIFIED: ICD-10-CM

## 2024-03-07 PROCEDURE — G2211 COMPLEX E/M VISIT ADD ON: CPT

## 2024-03-07 PROCEDURE — 99214 OFFICE O/P EST MOD 30 MIN: CPT

## 2024-03-07 RX ORDER — ATORVASTATIN CALCIUM 10 MG/1
10 TABLET, FILM COATED ORAL
Qty: 90 | Refills: 3 | Status: ACTIVE | COMMUNITY
Start: 2020-10-13 | End: 1900-01-01

## 2024-03-07 NOTE — ASSESSMENT
[FreeTextEntry1] : 73 year old with a history of: 1. DM  on ozempic repeat a1c today, cont on the 1.0 of Ozempic and cont to do well and check a1c today  2HTN controlled very well  3 elevated RBC with mild elevation in Hgb, evaluated years ago and neg but repeat  4.HLD on lipitor  was 177/50/95/155 5 meds renewed Ozempic

## 2024-03-07 NOTE — HISTORY OF PRESENT ILLNESS
[de-identified] : 1. 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 sept colon with evans and was normal [FreeTextEntry1] : Here for follow up  for her htn, hld, dm Having a excision of a right breast papilloma on 3.26.24 . Complaining of a lot of body aches since x mas.

## 2024-03-08 ENCOUNTER — TRANSCRIPTION ENCOUNTER (OUTPATIENT)
Age: 74
End: 2024-03-08

## 2024-03-08 LAB
ALBUMIN SERPL ELPH-MCNC: 4.3 G/DL
ALP BLD-CCNC: 87 U/L
ALT SERPL-CCNC: 20 U/L
ANION GAP SERPL CALC-SCNC: 16 MMOL/L
AST SERPL-CCNC: 21 U/L
BILIRUB SERPL-MCNC: 0.4 MG/DL
BUN SERPL-MCNC: 13 MG/DL
CALCIUM SERPL-MCNC: 9.8 MG/DL
CHLORIDE SERPL-SCNC: 102 MMOL/L
CHOLEST SERPL-MCNC: 135 MG/DL
CO2 SERPL-SCNC: 22 MMOL/L
CREAT SERPL-MCNC: 0.52 MG/DL
EGFR: 97 ML/MIN/1.73M2
ESTIMATED AVERAGE GLUCOSE: 157 MG/DL
GLUCOSE SERPL-MCNC: 101 MG/DL
HBA1C MFR BLD HPLC: 7.1 %
HDLC SERPL-MCNC: 45 MG/DL
LDLC SERPL CALC-MCNC: 73 MG/DL
NONHDLC SERPL-MCNC: 90 MG/DL
POTASSIUM SERPL-SCNC: 4.2 MMOL/L
PROT SERPL-MCNC: 7.2 G/DL
SODIUM SERPL-SCNC: 140 MMOL/L
TRIGL SERPL-MCNC: 91 MG/DL

## 2024-03-13 ENCOUNTER — NON-APPOINTMENT (OUTPATIENT)
Age: 74
End: 2024-03-13

## 2024-03-18 ENCOUNTER — APPOINTMENT (OUTPATIENT)
Dept: MAMMOGRAPHY | Facility: IMAGING CENTER | Age: 74
End: 2024-03-18
Payer: MEDICARE

## 2024-03-18 ENCOUNTER — OUTPATIENT (OUTPATIENT)
Dept: OUTPATIENT SERVICES | Facility: HOSPITAL | Age: 74
LOS: 1 days | End: 2024-03-18
Payer: MEDICARE

## 2024-03-18 ENCOUNTER — OUTPATIENT (OUTPATIENT)
Dept: OUTPATIENT SERVICES | Facility: HOSPITAL | Age: 74
LOS: 1 days | End: 2024-03-18

## 2024-03-18 VITALS
OXYGEN SATURATION: 99 % | SYSTOLIC BLOOD PRESSURE: 111 MMHG | DIASTOLIC BLOOD PRESSURE: 70 MMHG | WEIGHT: 177.03 LBS | TEMPERATURE: 97 F | HEIGHT: 62.5 IN | RESPIRATION RATE: 16 BRPM | HEART RATE: 80 BPM

## 2024-03-18 DIAGNOSIS — R92.8 OTHER ABNORMAL AND INCONCLUSIVE FINDINGS ON DIAGNOSTIC IMAGING OF BREAST: ICD-10-CM

## 2024-03-18 DIAGNOSIS — Z00.8 ENCOUNTER FOR OTHER GENERAL EXAMINATION: ICD-10-CM

## 2024-03-18 DIAGNOSIS — I10 ESSENTIAL (PRIMARY) HYPERTENSION: ICD-10-CM

## 2024-03-18 DIAGNOSIS — E11.9 TYPE 2 DIABETES MELLITUS WITHOUT COMPLICATIONS: ICD-10-CM

## 2024-03-18 PROCEDURE — 19281 PERQ DEVICE BREAST 1ST IMAG: CPT | Mod: RT

## 2024-03-18 PROCEDURE — A4648: CPT

## 2024-03-18 PROCEDURE — 19281 PERQ DEVICE BREAST 1ST IMAG: CPT

## 2024-03-18 RX ORDER — SODIUM CHLORIDE 9 MG/ML
1000 INJECTION, SOLUTION INTRAVENOUS
Refills: 0 | Status: DISCONTINUED | OUTPATIENT
Start: 2024-03-26 | End: 2024-04-09

## 2024-03-18 NOTE — H&P PST ADULT - NSANTHOSAYNRD_GEN_A_CORE
No. SONU screening performed.  STOP BANG Legend: 0-2 = LOW Risk; 3-4 = INTERMEDIATE Risk; 5-8 = HIGH Risk

## 2024-03-18 NOTE — H&P PST ADULT - NSICDXPASTMEDICALHX_GEN_ALL_CORE_FT
PAST MEDICAL HISTORY:  DM (diabetes mellitus)     Fibroadenoma of breast, left     History of cholecystectomy 6/2013    History of polycythemia     History of radiation therapy Sept 1998- Dec 1998    HLD (hyperlipidemia)     HTN (hypertension)     Malignant neoplasm of breast     Obesity (BMI 30-39.9)     Personal history of chemotherapy

## 2024-03-18 NOTE — H&P PST ADULT - PROBLEM SELECTOR PLAN 2
Last day of Farxiga 3/22/24. Patient verbalized understanding.   last day od Ozempic                . Patient verbalized understanding. Last day of Farxiga 3/22/24. Patient verbalized understanding.   last day of Ozempic   3/12/24 . Patient verbalized understanding.

## 2024-03-18 NOTE — H&P PST ADULT - PROBLEM SELECTOR PLAN 1
Patient tentatively scheduled for  right breast biopsy with seed localization on 3/26/24.  Pre-op instructions provided. Pt given verbal and written instructions with teach back on chlorhexidine wash and pepcid. Pt verbalized understanding with return demonstration.      CMP from 3/7/24 - WNL   A1c from 3/7/24 - 7.1%

## 2024-03-18 NOTE — H&P PST ADULT - NSICDXPASTSURGICALHX_GEN_ALL_CORE_FT
PAST SURGICAL HISTORY:  H/O bilateral breast biopsy left breast X3, right breast X4    H/O rotator cuff surgery, right 2004    Status post breast lumpectomy, left 1998

## 2024-03-18 NOTE — H&P PST ADULT - HISTORY OF PRESENT ILLNESS
74 year old female with PMH of HTN, HLD, Type 2 DM,  presents to Presurgical testing with diagnosis of other abnormal and inconclusive finding on Dx image breast scheduled for right breast biopsy with seed localization.  74 year old female with PMH of HTN, HLD, Type 2 DM,  breast cancer ( s/p chemo and radiation -1998),  polycythemia presents to Presurgical testing with diagnosis of other abnormal and inconclusive finding on Dx image breast scheduled for right breast biopsy with seed localization.  74 year old female with PMH of HTN, HLD, Type 2 DM,  breast cancer ( s/p chemo and radiation -1998),  polycythemia with elevated hemoglobin and hematocritpresents to Presurgical testing with diagnosis of other abnormal and inconclusive finding on Dx image breast scheduled for right breast biopsy with seed localization.

## 2024-03-18 NOTE — H&P PST ADULT - PROBLEM SELECTOR PLAN 3
Patient instructed to take Losartan with a sip of water on the morning of procedure. Patient verbalized understanding.

## 2024-03-25 NOTE — ASU PATIENT PROFILE, ADULT - NPO AFTER
23:00 Present to ED with worsening weakness since diagnosis of Parkinson's disease  Unable to stand up due to weakness  Difficulty in taking care of self due to weakness  CT head with no acute pathology, mid to moderate chronic microvascular changes  ED consulted neurology Dr Malone. Given 1 dose of Sinemet 25/100  Continue Sinemet 25/100 qid for now  PT consult  Check CK, thyroid function

## 2024-03-25 NOTE — ASU PATIENT PROFILE, ADULT - INTERNATIONAL TRAVEL
Continue the methylphenidate as prescribed for the remainder of the school year.  May discontinue the afternoon small dose over the summer, if he does not need it    If side effects develop or concerns arise, please call our office.  Otherwise, we will re-assess in September for an in office full physical exam, around Shravan's birthday  
No

## 2024-03-26 ENCOUNTER — OUTPATIENT (OUTPATIENT)
Dept: OUTPATIENT SERVICES | Facility: HOSPITAL | Age: 74
LOS: 1 days | Discharge: HOME | End: 2024-03-26
Payer: MEDICARE

## 2024-03-26 ENCOUNTER — APPOINTMENT (OUTPATIENT)
Dept: SURGERY | Facility: HOSPITAL | Age: 74
End: 2024-03-26

## 2024-03-26 ENCOUNTER — TRANSCRIPTION ENCOUNTER (OUTPATIENT)
Age: 74
End: 2024-03-26

## 2024-03-26 VITALS
OXYGEN SATURATION: 97 % | SYSTOLIC BLOOD PRESSURE: 124 MMHG | HEART RATE: 86 BPM | WEIGHT: 177.03 LBS | DIASTOLIC BLOOD PRESSURE: 76 MMHG | HEIGHT: 62.5 IN | TEMPERATURE: 98 F | RESPIRATION RATE: 16 BRPM

## 2024-03-26 VITALS
DIASTOLIC BLOOD PRESSURE: 56 MMHG | RESPIRATION RATE: 16 BRPM | SYSTOLIC BLOOD PRESSURE: 101 MMHG | HEART RATE: 73 BPM | OXYGEN SATURATION: 95 %

## 2024-03-26 DIAGNOSIS — R92.8 OTHER ABNORMAL AND INCONCLUSIVE FINDINGS ON DIAGNOSTIC IMAGING OF BREAST: ICD-10-CM

## 2024-03-26 PROBLEM — I10 ESSENTIAL (PRIMARY) HYPERTENSION: Chronic | Status: ACTIVE | Noted: 2024-03-18

## 2024-03-26 LAB — GLUCOSE BLDC GLUCOMTR-MCNC: 185 MG/DL — HIGH (ref 70–99)

## 2024-03-26 PROCEDURE — 76098 X-RAY EXAM SURGICAL SPECIMEN: CPT | Mod: 26

## 2024-03-26 PROCEDURE — 19125K: CUSTOM | Mod: RT

## 2024-03-26 PROCEDURE — 88307 TISSUE EXAM BY PATHOLOGIST: CPT | Mod: 26

## 2024-03-26 RX ORDER — SEMAGLUTIDE 0.68 MG/ML
1 INJECTION, SOLUTION SUBCUTANEOUS
Refills: 0 | DISCHARGE

## 2024-03-26 RX ORDER — ATORVASTATIN CALCIUM 80 MG/1
1 TABLET, FILM COATED ORAL
Refills: 0 | DISCHARGE

## 2024-03-26 RX ORDER — DAPAGLIFLOZIN 10 MG/1
1 TABLET, FILM COATED ORAL
Refills: 0 | DISCHARGE

## 2024-03-26 RX ORDER — LOSARTAN POTASSIUM 100 MG/1
1 TABLET, FILM COATED ORAL
Refills: 0 | DISCHARGE

## 2024-03-26 RX ADMIN — SODIUM CHLORIDE 30 MILLILITER(S): 9 INJECTION, SOLUTION INTRAVENOUS at 09:15

## 2024-03-26 NOTE — ASU DISCHARGE PLAN (ADULT/PEDIATRIC) - NS MD DC FALL RISK RISK
For information on Fall & Injury Prevention, visit: [email or web address] OR  [email or web address] OR  https://www.cdc.gov/steadi/patient.html

## 2024-03-26 NOTE — ASU DISCHARGE PLAN (ADULT/PEDIATRIC) - CARE PROVIDER_API CALL
Nadja Rushing  Surgical Oncology  [address], [address]  [place] [address]  Phone: [phone]  Fax: [phone]  Follow Up Time: 1 week

## 2024-03-26 NOTE — ASU DISCHARGE PLAN (ADULT/PEDIATRIC) - NURSING INSTRUCTIONS
DO NOT take any Tylenol (Acetaminophen) or narcotics containing Tylenol until after  _4:45 pm . You received Tylenol during your operation and it can cause damage to your liver if too much is taken within a 24 hour time period.

## 2024-03-26 NOTE — ASU DISCHARGE PLAN (ADULT/PEDIATRIC) - ASU DC SPECIAL INSTRUCTIONSFT
ACTIVITY: Full activity, including driving next day as tolerated. No vigorous exercise. Wear bra as desired or tolerated.  BATHING: Remove outer bandage next day. Shower allowed. Pat wound dry.  MEDICATIONS: You may take one Extra-Strength Tylenol as needed for pain  BLEEDING: After surgery, some blood may escape from under the tape. It is of no consequence. The paper tape may fall off after several days. If not, Dr. Rushing will remove it in her office.  BRUISING: As the days go by, black and blue areas may become more noticeable. These areas will disappear within several weeks. In addition, the area around the incision may feel very hard and look swollen. It is normal and it may take several months to subside.     CALL OFFICE:  1) If brisk bleeding is occurring through several new bandages  2) If you breast becomes warm or red, with or without pus  3) If you have a fever greater than 101F    FOLLOW UP: Please call Dr. Rushing's office and make an appointment for approximately one week from now. The number is: [phone]

## 2024-03-28 LAB — SURGICAL PATHOLOGY STUDY: SIGNIFICANT CHANGE UP

## 2024-04-01 ENCOUNTER — APPOINTMENT (OUTPATIENT)
Dept: SURGERY | Facility: CLINIC | Age: 74
End: 2024-04-01
Payer: MEDICARE

## 2024-04-01 PROCEDURE — 99024 POSTOP FOLLOW-UP VISIT: CPT

## 2024-04-15 PROBLEM — E11.9 TYPE 2 DIABETES MELLITUS WITHOUT COMPLICATIONS: Chronic | Status: ACTIVE | Noted: 2024-03-18

## 2024-04-15 PROBLEM — E78.5 HYPERLIPIDEMIA, UNSPECIFIED: Chronic | Status: ACTIVE | Noted: 2024-03-18

## 2024-04-24 ENCOUNTER — OFFICE (OUTPATIENT)
Dept: URBAN - METROPOLITAN AREA CLINIC 90 | Facility: CLINIC | Age: 74
Setting detail: OPHTHALMOLOGY
End: 2024-04-24
Payer: MEDICARE

## 2024-04-24 DIAGNOSIS — H25.13: ICD-10-CM

## 2024-04-24 DIAGNOSIS — H16.423: ICD-10-CM

## 2024-04-24 DIAGNOSIS — H35.373: ICD-10-CM

## 2024-04-24 DIAGNOSIS — H40.013: ICD-10-CM

## 2024-04-24 PROCEDURE — 92134 CPTRZ OPH DX IMG PST SGM RTA: CPT | Performed by: OPHTHALMOLOGY

## 2024-04-24 PROCEDURE — 92012 INTRM OPH EXAM EST PATIENT: CPT | Performed by: OPHTHALMOLOGY

## 2024-04-24 ASSESSMENT — LID POSITION - DERMATOCHALASIS
OS_DERMATOCHALASIS: LUL T
OD_DERMATOCHALASIS: RUL T

## 2024-04-24 ASSESSMENT — LID EXAM ASSESSMENTS
OS_COMMENTS: MILD SCURF
OD_COMMENTS: MILD SCURF
OS_COMMENTS: TELANGIECTATIC LID MARGINS
OD_COMMENTS: TELANGIECTATIC LID MARGINS

## 2024-04-24 ASSESSMENT — LID POSITION - COMMENTS
OD_COMMENTS: TEMPORAL HOODING
OS_COMMENTS: TEMPORAL HOODING

## 2024-05-13 ENCOUNTER — APPOINTMENT (OUTPATIENT)
Dept: RHEUMATOLOGY | Facility: CLINIC | Age: 74
End: 2024-05-13
Payer: MEDICARE

## 2024-05-13 VITALS
DIASTOLIC BLOOD PRESSURE: 74 MMHG | HEART RATE: 93 BPM | BODY MASS INDEX: 31.01 KG/M2 | SYSTOLIC BLOOD PRESSURE: 118 MMHG | OXYGEN SATURATION: 97 % | WEIGHT: 175 LBS | HEIGHT: 63 IN

## 2024-05-13 DIAGNOSIS — Z13.820 ENCOUNTER FOR SCREENING FOR OSTEOPOROSIS: ICD-10-CM

## 2024-05-13 PROCEDURE — 99203 OFFICE O/P NEW LOW 30 MIN: CPT

## 2024-05-13 PROCEDURE — G2211 COMPLEX E/M VISIT ADD ON: CPT

## 2024-05-13 NOTE — PHYSICAL EXAM
[General Appearance - Alert] : alert [General Appearance - In No Acute Distress] : in no acute distress [Sclera] : the sclera and conjunctiva were normal [PERRL With Normal Accommodation] : pupils were equal in size, round, and reactive to light [Extraocular Movements] : extraocular movements were intact [Outer Ear] : the ears and nose were normal in appearance [Oropharynx] : the oropharynx was normal [Neck Appearance] : the appearance of the neck was normal [Neck Cervical Mass (___cm)] : no neck mass was observed [Jugular Venous Distention Increased] : there was no jugular-venous distention [Thyroid Diffuse Enlargement] : the thyroid was not enlarged [Thyroid Nodule] : there were no palpable thyroid nodules [Auscultation Breath Sounds / Voice Sounds] : lungs were clear to auscultation bilaterally [Heart Rate And Rhythm] : heart rate was normal and rhythm regular [Heart Sounds] : normal S1 and S2 [Heart Sounds Gallop] : no gallops [Murmurs] : no murmurs [Heart Sounds Pericardial Friction Rub] : no pericardial rub [Full Pulse] : the pedal pulses are present [Edema] : there was no peripheral edema [Cervical Lymph Nodes Enlarged Posterior Bilaterally] : posterior cervical [Cervical Lymph Nodes Enlarged Anterior Bilaterally] : anterior cervical [Supraclavicular Lymph Nodes Enlarged Bilaterally] : supraclavicular [Axillary Lymph Nodes Enlarged Bilaterally] : axillary [No CVA Tenderness] : no ~M costovertebral angle tenderness [No Spinal Tenderness] : no spinal tenderness [Skin Color & Pigmentation] : normal skin color and pigmentation [Skin Turgor] : normal skin turgor [] : no rash [No Focal Deficits] : no focal deficits [Oriented To Time, Place, And Person] : oriented to person, place, and time [Impaired Insight] : insight and judgment were intact [Affect] : the affect was normal [FreeTextEntry1] : Difficulty with ROM of the shoulder bilaterally beyond 80 degrees, pain with rotation. Swelling in the L 3rd PIP joint. OA changes in the hands and knees. Bunion with subluxation in the 1st MTP bilaterally R > L

## 2024-05-13 NOTE — ASSESSMENT
[FreeTextEntry1] : 74-year-old female here for evaluation of joint pains   1. Polyarthralgia and myalgia with clinical history and exam concerning for polymyalgia rheumatica. Also has OA in the hands and knees which is likely contributing to her current symptoms or rather exacerbated with her PMR presentation. Given swelling in the L 3rd PIP and bunion deformity with subluxation in the 1st MTP, will also evaluate for RA. Labs sent - inflammatory markers, RF and CCP. Will do a trial of low dose Prednisone 10 mg daily and reassess symptoms.   2. DEXA ordered as last performed > 2 years ago.   Follow up in 6 weeks

## 2024-05-13 NOTE — HISTORY OF PRESENT ILLNESS
[Currently Experiencing] : currently [Arthralgias] : arthralgias [Morning Stiffness] : morning stiffness [Difficulty Standing] : difficulty standing [Difficulty Walking] : difficulty walking [Myalgias] : myalgias [Muscle Weakness] : muscle weakness [FreeTextEntry1] : 74 year old female here for evaluation of joint pains   SHe is pretty active. Woke up Dec 28th - had done a lot of entertaining during the holiday. Couldn't move, felt achiness all over the body - thighs, upper arms, knees, neck. Had trouble getting out of bed, climbing stairs. Had a lumpectomy in March 2024 and for 3 days after that she felt fine but her symptoms have come back again. Goes to an [email or web address] activity center and is able to do some exercises but she notices more difficulty doing exercises that use her shoulders. Also difficutly with getting up if sitting on the floor.  Lumpectomy showed atypical cells but has had prior lumpectomy with evidence of malignancy in 1998 and 2004.   No h/o blood clots. No headaches. No jaw pain. no vision changes. No scalp tenderness.  [Anorexia] : no anorexia [Weight Loss] : no weight loss [Malaise] : no malaise [Fever] : no fever [Chills] : no chills [Fatigue] : no fatigue [Depression] : no depression [Malar Facial Rash] : no malar facial rash [Skin Lesions] : no lesions [Skin Nodules] : no skin nodules [Oral Ulcers] : no oral ulcers [Cough] : no cough [Dry Mouth] : no dry mouth [Dysphonia] : no dysphonia [Dysphagia] : no dysphagia [Shortness of Breath] : no shortness of breath [Chest Pain] : no chest pain [Joint Swelling] : no joint swelling [Joint Warmth] : no joint warmth [Joint Deformity] : no joint deformity [Decreased ROM] : no decreased range of motion [Falls] : no falls [Dyspnea] : no dyspnea [Muscle Spasms] : no muscle spasms [Muscle Cramping] : no muscle cramping [Visual Changes] : no visual changes [Eye Pain] : no eye pain [Eye Redness] : no eye redness [Dry Eyes] : no dry eyes

## 2024-05-20 ENCOUNTER — TRANSCRIPTION ENCOUNTER (OUTPATIENT)
Age: 74
End: 2024-05-20

## 2024-05-20 LAB
ALBUMIN SERPL ELPH-MCNC: 4.4 G/DL
ALP BLD-CCNC: 85 U/L
ALT SERPL-CCNC: 15 U/L
ANION GAP SERPL CALC-SCNC: 11 MMOL/L
AST SERPL-CCNC: 20 U/L
BASOPHILS # BLD AUTO: 0.04 K/UL
BASOPHILS NFR BLD AUTO: 0.6 %
BILIRUB SERPL-MCNC: 0.4 MG/DL
BUN SERPL-MCNC: 12 MG/DL
CALCIUM SERPL-MCNC: 9.2 MG/DL
CCP AB SER IA-ACNC: <8 UNITS
CHLORIDE SERPL-SCNC: 104 MMOL/L
CO2 SERPL-SCNC: 26 MMOL/L
CREAT SERPL-MCNC: 0.56 MG/DL
CRP SERPL-MCNC: 4 MG/L
EGFR: 96 ML/MIN/1.73M2
EOSINOPHIL # BLD AUTO: 0.11 K/UL
EOSINOPHIL NFR BLD AUTO: 1.7 %
ERYTHROCYTE [SEDIMENTATION RATE] IN BLOOD BY WESTERGREN METHOD: 21 MM/HR
FERRITIN SERPL-MCNC: 257 NG/ML
GLUCOSE SERPL-MCNC: 132 MG/DL
HCT VFR BLD CALC: 45.9 %
HGB BLD-MCNC: 14.4 G/DL
IL6 SERPL-MCNC: <2.5 PG/ML
IMM GRANULOCYTES NFR BLD AUTO: 0.2 %
LYMPHOCYTES # BLD AUTO: 1.79 K/UL
LYMPHOCYTES NFR BLD AUTO: 28.1 %
MAN DIFF?: NORMAL
MCHC RBC-ENTMCNC: 27.5 PG
MCHC RBC-ENTMCNC: 31.4 GM/DL
MCV RBC AUTO: 87.8 FL
MONOCYTES # BLD AUTO: 0.6 K/UL
MONOCYTES NFR BLD AUTO: 9.4 %
NEUTROPHILS # BLD AUTO: 3.83 K/UL
NEUTROPHILS NFR BLD AUTO: 60 %
PLATELET # BLD AUTO: 211 K/UL
POTASSIUM SERPL-SCNC: 4.4 MMOL/L
PROT SERPL-MCNC: 7 G/DL
RBC # BLD: 5.23 M/UL
RBC # FLD: 15.2 %
RF+CCP IGG SER-IMP: NEGATIVE
RHEUMATOID FACT SER QL: 12 IU/ML
SODIUM SERPL-SCNC: 141 MMOL/L
WBC # FLD AUTO: 6.38 K/UL

## 2024-05-24 ENCOUNTER — TRANSCRIPTION ENCOUNTER (OUTPATIENT)
Age: 74
End: 2024-05-24

## 2024-05-30 ENCOUNTER — TRANSCRIPTION ENCOUNTER (OUTPATIENT)
Age: 74
End: 2024-05-30

## 2024-05-30 RX ORDER — PREDNISONE 1 MG/1
1 TABLET ORAL
Qty: 270 | Refills: 1 | Status: ACTIVE | COMMUNITY
Start: 2024-05-30 | End: 1900-01-01

## 2024-05-30 RX ORDER — PREDNISONE 10 MG/1
10 TABLET ORAL
Qty: 15 | Refills: 0 | Status: DISCONTINUED | COMMUNITY
Start: 2024-05-20 | End: 2024-05-30

## 2024-06-03 ENCOUNTER — LABORATORY RESULT (OUTPATIENT)
Age: 74
End: 2024-06-03

## 2024-06-05 ENCOUNTER — APPOINTMENT (OUTPATIENT)
Dept: RADIOLOGY | Facility: CLINIC | Age: 74
End: 2024-06-05
Payer: MEDICARE

## 2024-06-05 PROCEDURE — 77080 DXA BONE DENSITY AXIAL: CPT

## 2024-06-06 ENCOUNTER — APPOINTMENT (OUTPATIENT)
Dept: INTERNAL MEDICINE | Facility: CLINIC | Age: 74
End: 2024-06-06
Payer: MEDICARE

## 2024-06-06 VITALS
HEART RATE: 74 BPM | SYSTOLIC BLOOD PRESSURE: 110 MMHG | RESPIRATION RATE: 15 BRPM | HEIGHT: 63 IN | WEIGHT: 175 LBS | BODY MASS INDEX: 31.01 KG/M2 | TEMPERATURE: 97.7 F | OXYGEN SATURATION: 97 % | DIASTOLIC BLOOD PRESSURE: 66 MMHG

## 2024-06-06 DIAGNOSIS — E78.2 MIXED HYPERLIPIDEMIA: ICD-10-CM

## 2024-06-06 DIAGNOSIS — M35.3 POLYMYALGIA RHEUMATICA: ICD-10-CM

## 2024-06-06 DIAGNOSIS — C50.912 MALIGNANT NEOPLASM OF UNSPECIFIED SITE OF LEFT FEMALE BREAST: ICD-10-CM

## 2024-06-06 DIAGNOSIS — E07.9 DISORDER OF THYROID, UNSPECIFIED: ICD-10-CM

## 2024-06-06 DIAGNOSIS — E11.9 TYPE 2 DIABETES MELLITUS W/OUT COMPLICATIONS: ICD-10-CM

## 2024-06-06 PROCEDURE — G0439: CPT

## 2024-06-06 PROCEDURE — G0444 DEPRESSION SCREEN ANNUAL: CPT | Mod: 59

## 2024-06-06 RX ORDER — LANCETS 28 GAUGE
EACH MISCELLANEOUS
Qty: 100 | Refills: 4 | Status: ACTIVE | COMMUNITY
Start: 2020-07-21 | End: 1900-01-01

## 2024-06-06 RX ORDER — LOSARTAN POTASSIUM 25 MG/1
25 TABLET, FILM COATED ORAL DAILY
Qty: 90 | Refills: 3 | Status: ACTIVE | COMMUNITY
Start: 1900-01-01 | End: 1900-01-01

## 2024-06-06 RX ORDER — DAPAGLIFLOZIN 10 MG/1
10 TABLET, FILM COATED ORAL
Qty: 90 | Refills: 3 | Status: ACTIVE | COMMUNITY
Start: 2020-04-14 | End: 1900-01-01

## 2024-06-06 RX ORDER — SEMAGLUTIDE 2.68 MG/ML
8 INJECTION, SOLUTION SUBCUTANEOUS
Qty: 3 | Refills: 3 | Status: ACTIVE | COMMUNITY
Start: 2023-05-08 | End: 1900-01-01

## 2024-06-06 NOTE — HISTORY OF PRESENT ILLNESS
[FreeTextEntry1] : here for annual PE [de-identified] : here for full PE, seen by rheum, dx with PMR now on tapering pred and feeling "wonderful".

## 2024-06-06 NOTE — PHYSICAL EXAM
[Normal] : affect was normal and insight and judgment were intact [de-identified] : volume dec bilaterally, healed scars  [de-identified] : obese

## 2024-06-06 NOTE — ASSESSMENT
[FreeTextEntry1] : 73 year old with a history of: 1. DM  on ozempic repeat a1c was 7.2 and on pred and fasting glu 132 and stable to increase to 2.0.   2  HTN controlled very well  3 elevated RBC with mild elevation in Hgb, evaluated years ago and neg hgb 14.4 now and  stable  4.HLD on lipitor  was 143/52/69/127 5 meds renewed Ozempic , needs lancets, and other  meds 6 Immuniztions: pna x 2, shingrix x 2, covid, last tetanus vaccine to go to the pharm and see if better coverage.  7 HCM Mammo in the fall, follow up mri as well, colon done in 2023, optho done in 4.24, dexa done 6.5.24 and mild osteopenia.  3-4 months

## 2024-06-06 NOTE — HEALTH RISK ASSESSMENT
[Good] : ~his/her~ current health as good [Very Good] : ~his/her~  mood as very good [Monthly or less (1 pt)] : Monthly or less (1 point) [1 or 2 (0 pts)] : 1 or 2 (0 points) [Never (0 pts)] : Never (0 points) [No] : In the past 12 months have you used drugs other than those required for medical reasons? No [No falls in past year] : Patient reported no falls in the past year [Little interest or pleasure doing things] : 1) Little interest or pleasure doing things [Feeling down, depressed, or hopeless] : 2) Feeling down, depressed, or hopeless [0] : 2) Feeling down, depressed, or hopeless: Not at all (0) [PHQ-2 Negative - No further assessment needed] : PHQ-2 Negative - No further assessment needed [Never] : Never [No Retinopathy] : No retinopathy [Patient reported mammogram was abnormal] : Patient reported mammogram was abnormal [Audit-CScore] : 0 [de-identified] : 3x week [[id number]] : 0 [EyeExamDate] : 4/24 [HIV test declined] : HIV test declined [Hepatitis C test declined] : Hepatitis C test declined [With Significant Other] : lives with significant other [Retired] : retired [Graduate School] : graduate school [] :  [# Of Children ___] : has [unfilled] children [High Risk Behavior] : no high risk behavior [Feels Safe at Home] : Feels safe at home [Fully functional (bathing, dressing, toileting, transferring, walking, feeding)] : Fully functional (bathing, dressing, toileting, transferring, walking, feeding) [Smoke Detector] : smoke detector [Carbon Monoxide Detector] : carbon monoxide detector [Guns at Home] : no guns at home [Seat Belt] :  uses seat belt [Sunscreen] : uses sunscreen [MammogramDate] : 2/24 [MammogramComments] : lumpectomy neg atypical cells  [PapSmearDate] : n/a [BoneDensityDate] : 6.5.24 [BoneDensityComments] : mild osteopenia, frax neg [ColonoscopyDate] : 9.23 [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 [AdvancecareDate] : 6.6.24

## 2024-07-10 ENCOUNTER — APPOINTMENT (OUTPATIENT)
Dept: RHEUMATOLOGY | Facility: CLINIC | Age: 74
End: 2024-07-10
Payer: MEDICARE

## 2024-07-10 VITALS
RESPIRATION RATE: 15 BRPM | HEART RATE: 74 BPM | OXYGEN SATURATION: 98 % | BODY MASS INDEX: 33.66 KG/M2 | HEIGHT: 63 IN | SYSTOLIC BLOOD PRESSURE: 142 MMHG | DIASTOLIC BLOOD PRESSURE: 87 MMHG | WEIGHT: 190 LBS

## 2024-07-10 DIAGNOSIS — M35.3 POLYMYALGIA RHEUMATICA: ICD-10-CM

## 2024-07-10 PROCEDURE — 99213 OFFICE O/P EST LOW 20 MIN: CPT | Mod: GC

## 2024-07-11 ENCOUNTER — TRANSCRIPTION ENCOUNTER (OUTPATIENT)
Age: 74
End: 2024-07-11

## 2024-07-11 LAB
ALBUMIN SERPL ELPH-MCNC: 4.7 G/DL
ALP BLD-CCNC: 73 U/L
ALT SERPL-CCNC: 23 U/L
ANION GAP SERPL CALC-SCNC: 13 MMOL/L
AST SERPL-CCNC: 23 U/L
BILIRUB SERPL-MCNC: 0.3 MG/DL
BUN SERPL-MCNC: 14 MG/DL
CALCIUM SERPL-MCNC: 9.8 MG/DL
CHLORIDE SERPL-SCNC: 104 MMOL/L
CO2 SERPL-SCNC: 23 MMOL/L
CREAT SERPL-MCNC: 0.62 MG/DL
EGFR: 93 ML/MIN/1.73M2
ERYTHROCYTE [SEDIMENTATION RATE] IN BLOOD BY WESTERGREN METHOD: 11 MM/HR
GLUCOSE SERPL-MCNC: 175 MG/DL
HCT VFR BLD CALC: 47.3 %
MCHC RBC-ENTMCNC: 29.5 PG
MCV RBC AUTO: 91.3 FL
PLATELET # BLD AUTO: 171 K/UL
POTASSIUM SERPL-SCNC: 4.5 MMOL/L
PROT SERPL-MCNC: 6.9 G/DL
RBC # BLD: 5.18 M/UL
RBC # FLD: 15.9 %
SODIUM SERPL-SCNC: 141 MMOL/L
WBC # FLD AUTO: 7.32 K/UL

## 2024-07-12 ENCOUNTER — TRANSCRIPTION ENCOUNTER (OUTPATIENT)
Age: 74
End: 2024-07-12

## 2024-07-12 VITALS
HEART RATE: 56 BPM | DIASTOLIC BLOOD PRESSURE: 72 MMHG | WEIGHT: 179 LBS | OXYGEN SATURATION: 97 % | BODY MASS INDEX: 31.71 KG/M2 | HEIGHT: 63 IN | RESPIRATION RATE: 16 BRPM | SYSTOLIC BLOOD PRESSURE: 121 MMHG

## 2024-07-12 LAB — IL6 SERPL-MCNC: <2.5 PG/ML

## 2024-07-29 ENCOUNTER — RX RENEWAL (OUTPATIENT)
Age: 74
End: 2024-07-29

## 2024-08-20 ENCOUNTER — APPOINTMENT (OUTPATIENT)
Dept: RHEUMATOLOGY | Facility: CLINIC | Age: 74
End: 2024-08-20
Payer: MEDICARE

## 2024-08-20 VITALS
BODY MASS INDEX: 32.6 KG/M2 | DIASTOLIC BLOOD PRESSURE: 77 MMHG | OXYGEN SATURATION: 98 % | RESPIRATION RATE: 16 BRPM | HEART RATE: 76 BPM | HEIGHT: 63 IN | SYSTOLIC BLOOD PRESSURE: 126 MMHG | WEIGHT: 184 LBS

## 2024-08-20 PROCEDURE — G2211 COMPLEX E/M VISIT ADD ON: CPT

## 2024-08-20 PROCEDURE — 99214 OFFICE O/P EST MOD 30 MIN: CPT

## 2024-08-20 NOTE — PHYSICAL EXAM
[General Appearance - Alert] : alert [General Appearance - In No Acute Distress] : in no acute distress [Sclera] : the sclera and conjunctiva were normal [PERRL With Normal Accommodation] : pupils were equal in size, round, and reactive to light [Extraocular Movements] : extraocular movements were intact [Outer Ear] : the ears and nose were normal in appearance [Oropharynx] : the oropharynx was normal [Neck Appearance] : the appearance of the neck was normal [Neck Cervical Mass (___cm)] : no neck mass was observed [Jugular Venous Distention Increased] : there was no jugular-venous distention [Thyroid Diffuse Enlargement] : the thyroid was not enlarged [Thyroid Nodule] : there were no palpable thyroid nodules [Auscultation Breath Sounds / Voice Sounds] : lungs were clear to auscultation bilaterally [Heart Rate And Rhythm] : heart rate was normal and rhythm regular [Heart Sounds] : normal S1 and S2 [Heart Sounds Gallop] : no gallops [Murmurs] : no murmurs [Heart Sounds Pericardial Friction Rub] : no pericardial rub [Full Pulse] : the pedal pulses are present [Edema] : there was no peripheral edema [Cervical Lymph Nodes Enlarged Posterior Bilaterally] : posterior cervical [Cervical Lymph Nodes Enlarged Anterior Bilaterally] : anterior cervical [Supraclavicular Lymph Nodes Enlarged Bilaterally] : supraclavicular [Axillary Lymph Nodes Enlarged Bilaterally] : axillary [No CVA Tenderness] : no ~M costovertebral angle tenderness [No Spinal Tenderness] : no spinal tenderness [Abnormal Walk] : normal gait [Musculoskeletal - Swelling] : no joint swelling seen [Motor Tone] : muscle strength and tone were normal [FreeTextEntry1] : No synovitis.  [Skin Color & Pigmentation] : normal skin color and pigmentation [Skin Turgor] : normal skin turgor [] : no rash [No Focal Deficits] : no focal deficits [Oriented To Time, Place, And Person] : oriented to person, place, and time [Impaired Insight] : insight and judgment were intact [Affect] : the affect was normal

## 2024-08-20 NOTE — HISTORY OF PRESENT ILLNESS
[___ Week(s) Ago] : [unfilled] week(s) ago [FreeTextEntry1] : Doing well - dropped dose of Prednisone to 4 mg 3 days ago. Has started to do a lot more exercises with the shoulders and now notices occasional pain along the L triceps with certain movements.  [Currently Experiencing] : currently [Anorexia] : no anorexia [Weight Loss] : no weight loss [Malaise] : no malaise [Fever] : no fever [Chills] : no chills [Fatigue] : no fatigue [Depression] : no depression [Malar Facial Rash] : no malar facial rash [Skin Lesions] : no lesions [Skin Nodules] : no skin nodules [Oral Ulcers] : no oral ulcers [Cough] : no cough [Dry Mouth] : no dry mouth [Dysphonia] : no dysphonia [Dysphagia] : no dysphagia [Shortness of Breath] : no shortness of breath [Chest Pain] : no chest pain [Arthralgias] : arthralgias [Joint Swelling] : no joint swelling [Joint Warmth] : no joint warmth [Joint Deformity] : no joint deformity [Decreased ROM] : no decreased range of motion [Morning Stiffness] : morning stiffness [Falls] : no falls [Difficulty Standing] : difficulty standing [Difficulty Walking] : difficulty walking [Dyspnea] : no dyspnea [Myalgias] : myalgias [Muscle Weakness] : muscle weakness [Muscle Spasms] : no muscle spasms [Muscle Cramping] : no muscle cramping [Visual Changes] : no visual changes [Eye Pain] : no eye pain [Eye Redness] : no eye redness [Dry Eyes] : no dry eyes

## 2024-08-20 NOTE — ASSESSMENT
[FreeTextEntry1] : 74-year-old female here for evaluation of joint pains   1. Polyarthralgia and myalgia with clinical history and exam concerning for polymyalgia rheumatica. Also has OA in the hands and knees which is likely contributing to her current symptoms or rather exacerbated with her PMR presentation. Given swelling in the L 3rd PIP and bunion deformity with subluxation in the 1st MTP, also evaluated for RA. Labs reviewed RF and CCP neg, ESR 21, CRP 4. Started on prednisone 10 mg daily in 5/24, advised to taper down 1 mg every other week. Currently on prednisone 4 mg daily. Will continue taper down unless labs or symptoms indicated otherwise.  2. DEXA 06/05/24: Osteopenia   PLAN: 1. Decrease 1 mg every 2 weeks  2. C/w calcium and vitamin D  Follow up in 6 weeks

## 2024-08-20 NOTE — REVIEW OF SYSTEMS
[As Noted in HPI] : as noted in HPI [Arthralgias] : arthralgias [Joint Pain] : joint pain [Joint Swelling] : no joint swelling [Joint Stiffness] : joint stiffness [Limb Pain] : limb pain [Limb Swelling] : no limb swelling [Negative] : Heme/Lymph

## 2024-08-22 ENCOUNTER — TRANSCRIPTION ENCOUNTER (OUTPATIENT)
Age: 74
End: 2024-08-22

## 2024-08-22 LAB
ALBUMIN SERPL ELPH-MCNC: 4.4 G/DL
ALP BLD-CCNC: 75 U/L
ALT SERPL-CCNC: 22 U/L
ANION GAP SERPL CALC-SCNC: 14 MMOL/L
AST SERPL-CCNC: 22 U/L
BASOPHILS # BLD AUTO: 0.03 K/UL
BASOPHILS NFR BLD AUTO: 0.6 %
BILIRUB SERPL-MCNC: 0.4 MG/DL
BUN SERPL-MCNC: 13 MG/DL
CALCIUM SERPL-MCNC: 9.6 MG/DL
CHLORIDE SERPL-SCNC: 104 MMOL/L
CO2 SERPL-SCNC: 26 MMOL/L
CREAT SERPL-MCNC: 0.7 MG/DL
CRP SERPL-MCNC: <3 MG/L
EGFR: 91 ML/MIN/1.73M2
EOSINOPHIL # BLD AUTO: 0.08 K/UL
EOSINOPHIL NFR BLD AUTO: 1.5 %
ERYTHROCYTE [SEDIMENTATION RATE] IN BLOOD BY WESTERGREN METHOD: 6 MM/HR
GLUCOSE SERPL-MCNC: 165 MG/DL
HCT VFR BLD CALC: 47.8 %
HGB BLD-MCNC: 15.4 G/DL
IL6 SERPL-MCNC: <2.5 PG/ML
IMM GRANULOCYTES NFR BLD AUTO: 0.6 %
LYMPHOCYTES # BLD AUTO: 1.95 K/UL
LYMPHOCYTES NFR BLD AUTO: 37.4 %
MAN DIFF?: NORMAL
MCHC RBC-ENTMCNC: 30 PG
MCHC RBC-ENTMCNC: 32.2 GM/DL
MCV RBC AUTO: 93.2 FL
MONOCYTES # BLD AUTO: 0.58 K/UL
MONOCYTES NFR BLD AUTO: 11.1 %
NEUTROPHILS # BLD AUTO: 2.54 K/UL
NEUTROPHILS NFR BLD AUTO: 48.8 %
PLATELET # BLD AUTO: 178 K/UL
POTASSIUM SERPL-SCNC: 5 MMOL/L
PROT SERPL-MCNC: 6.7 G/DL
RBC # BLD: 5.13 M/UL
RBC # FLD: 15 %
SODIUM SERPL-SCNC: 144 MMOL/L
WBC # FLD AUTO: 5.21 K/UL

## 2024-09-12 ENCOUNTER — APPOINTMENT (OUTPATIENT)
Dept: INTERNAL MEDICINE | Facility: CLINIC | Age: 74
End: 2024-09-12
Payer: MEDICARE

## 2024-09-12 VITALS
WEIGHT: 184 LBS | BODY MASS INDEX: 32.6 KG/M2 | OXYGEN SATURATION: 97 % | TEMPERATURE: 97.8 F | SYSTOLIC BLOOD PRESSURE: 116 MMHG | HEIGHT: 63 IN | DIASTOLIC BLOOD PRESSURE: 73 MMHG | HEART RATE: 84 BPM

## 2024-09-12 DIAGNOSIS — M35.3 POLYMYALGIA RHEUMATICA: ICD-10-CM

## 2024-09-12 DIAGNOSIS — E78.2 MIXED HYPERLIPIDEMIA: ICD-10-CM

## 2024-09-12 DIAGNOSIS — E11.9 TYPE 2 DIABETES MELLITUS W/OUT COMPLICATIONS: ICD-10-CM

## 2024-09-12 DIAGNOSIS — C50.912 MALIGNANT NEOPLASM OF UNSPECIFIED SITE OF LEFT FEMALE BREAST: ICD-10-CM

## 2024-09-12 LAB — HBA1C MFR BLD HPLC: 7.8

## 2024-09-12 PROCEDURE — G2211 COMPLEX E/M VISIT ADD ON: CPT

## 2024-09-12 PROCEDURE — 99214 OFFICE O/P EST MOD 30 MIN: CPT

## 2024-09-12 PROCEDURE — 83036 HEMOGLOBIN GLYCOSYLATED A1C: CPT | Mod: QW

## 2024-09-12 NOTE — ASSESSMENT
[FreeTextEntry1] : 73 year old with a history of: 1. DM  on ozempic repeat a1c was 7.2 and on pred and fasting glu 132 and stable to increased to 2.0.  , a1c = 7.8 but now on tapering pred and will decrease riley in light of renewed exercise.  2  HTN controlled very well  3 elevated RBC with mild elevation in Hgb, evaluated years ago and neg hgb 14.4 now and  stable  4.HLD on lipitor  was 143/52/69/127 5 meds renewed Ozempic , needs lancets, and other  meds 6 Immuniztions: pna x 2, shingrix x 2, covid, last tetanus vaccine to go to the pharm and see if better coverage.  7 HCM Mammo/sono oct, follow up mri as well, colon done in 2023, optho done in 4.24, dexa done 6.5.24 and mild osteopenia.  3-4 months has meds on renewals

## 2024-09-12 NOTE — HISTORY OF PRESENT ILLNESS
[FreeTextEntry1] : here for follow up for her dm, htn hld PMR [de-identified] : No new issues, still with some pain in her L shoulder, on lowering pred per rheum, dx of PMR

## 2024-10-23 ENCOUNTER — OUTPATIENT (OUTPATIENT)
Dept: OUTPATIENT SERVICES | Facility: HOSPITAL | Age: 74
LOS: 1 days | End: 2024-10-23
Payer: MEDICARE

## 2024-10-23 ENCOUNTER — APPOINTMENT (OUTPATIENT)
Dept: ULTRASOUND IMAGING | Facility: IMAGING CENTER | Age: 74
End: 2024-10-23
Payer: MEDICARE

## 2024-10-23 ENCOUNTER — APPOINTMENT (OUTPATIENT)
Dept: MAMMOGRAPHY | Facility: IMAGING CENTER | Age: 74
End: 2024-10-23
Payer: MEDICARE

## 2024-10-23 DIAGNOSIS — Z00.8 ENCOUNTER FOR OTHER GENERAL EXAMINATION: ICD-10-CM

## 2024-10-23 PROCEDURE — 76641 ULTRASOUND BREAST COMPLETE: CPT | Mod: 26,50,GA

## 2024-10-23 PROCEDURE — 77067 SCR MAMMO BI INCL CAD: CPT | Mod: 26

## 2024-10-23 PROCEDURE — 77063 BREAST TOMOSYNTHESIS BI: CPT | Mod: 26

## 2024-10-30 ENCOUNTER — OFFICE (OUTPATIENT)
Age: 74
Setting detail: OPHTHALMOLOGY
End: 2024-10-30
Payer: MEDICARE

## 2024-10-30 DIAGNOSIS — H02.831: ICD-10-CM

## 2024-10-30 DIAGNOSIS — H02.834: ICD-10-CM

## 2024-10-30 DIAGNOSIS — H35.373: ICD-10-CM

## 2024-10-30 DIAGNOSIS — H43.813: ICD-10-CM

## 2024-10-30 DIAGNOSIS — E11.9: ICD-10-CM

## 2024-10-30 DIAGNOSIS — H35.363: ICD-10-CM

## 2024-10-30 DIAGNOSIS — H16.423: ICD-10-CM

## 2024-10-30 DIAGNOSIS — H25.13: ICD-10-CM

## 2024-10-30 DIAGNOSIS — H40.013: ICD-10-CM

## 2024-10-30 DIAGNOSIS — H35.40: ICD-10-CM

## 2024-10-30 DIAGNOSIS — H43.393: ICD-10-CM

## 2024-10-30 PROCEDURE — 92250 FUNDUS PHOTOGRAPHY W/I&R: CPT | Performed by: OPHTHALMOLOGY

## 2024-10-30 PROCEDURE — 92014 COMPRE OPH EXAM EST PT 1/>: CPT | Performed by: OPHTHALMOLOGY

## 2024-10-30 PROCEDURE — 92083 EXTENDED VISUAL FIELD XM: CPT | Performed by: OPHTHALMOLOGY

## 2024-10-30 ASSESSMENT — LID EXAM ASSESSMENTS
OS_COMMENTS: TELANGIECTATIC LID MARGINS
OD_COMMENTS: TELANGIECTATIC LID MARGINS
OS_COMMENTS: MILD SCURF
OD_COMMENTS: MILD SCURF

## 2024-10-30 ASSESSMENT — REFRACTION_MANIFEST
OS_AXIS: 080
OD_SPHERE: -0.25
OD_VA2: 20/20
OS_CYLINDER: -2.50
OS_SPHERE: -0.75
OD_ADD: +2.50
OD_VA2: 20/20
OS_CYLINDER: -1.75
OS_CYLINDER: -1.75
OD_AXIS: 090
OD_CYLINDER: -1.75
OD_ADD: +2.50
OD_CYLINDER: -0.75
OD_VA2: 20/25(J1)
OS_AXIS: 078
OS_AXIS: 095
OS_CYLINDER: -1.75
OS_SPHERE: -1.25
OS_VA1: 20/20-2
OS_ADD: +2.50
OD_VA1: 20/25-1
OS_VA1: 20/20-1
OS_CYLINDER: -1.75
OD_SPHERE: -0.75
OD_VA1: 20/20-1
OD_VA1: 20/20-1
OS_AXIS: 080
OS_ADD: +2.75
OD_VA1: 20/25
OD_CYLINDER: -2.25
OS_ADD: +2.50
OS_VA2: 20/20
OS_VA2: 20/20
OS_VA1: 20/20-2
OS_SPHERE: +1.00
OD_VA2: 20/20
OS_SPHERE: -1.25
OD_ADD: +2.50
OD_SPHERE: +1.75
OS_AXIS: 78
OS_VA2: 20/20
OS_ADD: +2.50
OD_CYLINDER: -0.75
OD_AXIS: 105
OS_VA1: 20/20-1
OS_SPHERE: -0.75
OD_SPHERE: -0.25
OD_SPHERE: -0.75
OS_VA2: 20/25(J1)
OD_CYLINDER: -0.75
OD_AXIS: 100
OD_AXIS: 90
OD_ADD: +2.75
OD_AXIS: 090

## 2024-10-30 ASSESSMENT — REFRACTION_CURRENTRX
OD_OVR_VA: 20/
OS_ADD: +2.00
OD_ADD: +2.50
OD_OVR_VA: 20/
OS_CYLINDER: -1.50
OD_VPRISM_DIRECTION: PROGS
OS_ADD: +2.50
OD_ADD: +2.50
OS_SPHERE: -1.50
OS_SPHERE: -0.50
OS_VPRISM_DIRECTION: PROGS
OD_CYLINDER: -1.25
OS_SPHERE: -1.75
OS_CYLINDER: -1.75
OS_ADD: +2.75
OS_SPHERE: -1.75
OD_ADD: +2.75
OS_SPHERE: -1.75
OS_OVR_VA: 20/
OD_SPHERE: -1.25
OD_VPRISM_DIRECTION: PROGS
OS_CYLINDER: -1.50
OD_VPRISM_DIRECTION: PROGS
OD_AXIS: 085
OD_AXIS: 098
OD_ADD: +2.25
OD_ADD: +2.50
OS_AXIS: 085
OS_ADD: +2.50
OD_SPHERE: -0.75
OS_AXIS: 076
OS_AXIS: 083
OD_CYLINDER: -1.25
OD_CYLINDER: -1.25
OS_SPHERE: -1.75
OD_VPRISM_DIRECTION: PROGS
OD_CYLINDER: -1.25
OD_SPHERE: -0.75
OS_AXIS: 074
OD_AXIS: 088
OS_CYLINDER: -1.50
OD_CYLINDER: -1.25
OD_ADD: +2.00
OD_SPHERE: -1.00
OD_CYLINDER: -1.25
OS_VPRISM_DIRECTION: PROGS
OD_SPHERE: -0.75
OS_CYLINDER: -1.50
OS_SPHERE: -1.50
OS_AXIS: 080
OS_CYLINDER: -1.75
OD_CYLINDER: -1.25
OS_OVR_VA: 20/
OD_SPHERE: -1.25
OS_ADD: +2.50
OS_CYLINDER: -1.75
OS_ADD: +2.50
OD_AXIS: 095
OS_VPRISM_DIRECTION: PROGS
OD_OVR_VA: 20/
OD_ADD: +2.50
OD_AXIS: 087
OD_VPRISM_DIRECTION: PROGS
OS_AXIS: 080
OS_SPHERE: -1.75
OD_CYLINDER: -1.75
OD_AXIS: 099
OD_AXIS: 101
OS_AXIS: 078
OD_SPHERE: -1.25
OS_OVR_VA: 20/
OS_AXIS: 098
OS_ADD: +2.50
OS_ADD: +2.25
OD_ADD: +2.50
OD_SPHERE: PLANO
OS_VPRISM_DIRECTION: PROGS
OD_AXIS: 069
OD_VPRISM_DIRECTION: PROGS
OS_CYLINDER: -1.75

## 2024-10-30 ASSESSMENT — REFRACTION_AUTOREFRACTION
OS_CYLINDER: -1.25
OS_AXIS: 100
OS_SPHERE: -0.25
OD_SPHERE: -0.50
OD_AXIS: 097
OD_CYLINDER: -1.75

## 2024-10-30 ASSESSMENT — VISUAL ACUITY
OD_BCVA: 20/20
OS_BCVA: 20/30+2

## 2024-10-30 ASSESSMENT — CONFRONTATIONAL VISUAL FIELD TEST (CVF)
OD_FINDINGS: FULL
OS_FINDINGS: FULL

## 2024-10-30 ASSESSMENT — VASCULARIZATION
OS_VASCULARIZATION: NASAL PANNUS
OD_VASCULARIZATION: NASAL PANNUS

## 2024-10-30 ASSESSMENT — KERATOMETRY
OS_K2POWER_DIOPTERS: 42.25
OS_AXISANGLE_DEGREES: 067
OD_K2POWER_DIOPTERS: 42.75
METHOD_AUTO_MANUAL: AUTO
OS_K1POWER_DIOPTERS: 42.00
OD_K1POWER_DIOPTERS: 42.25
OD_AXISANGLE_DEGREES: 036

## 2024-10-30 ASSESSMENT — LID POSITION - DERMATOCHALASIS
OD_DERMATOCHALASIS: RUL T
OS_DERMATOCHALASIS: LUL T

## 2024-10-30 ASSESSMENT — PACHYMETRY
OD_CT_UM: 583
OS_CT_CORRECTION: -4
OD_CT_CORRECTION: -3
OS_CT_UM: 595

## 2024-10-30 ASSESSMENT — LID POSITION - COMMENTS
OD_COMMENTS: TEMPORAL HOODING
OS_COMMENTS: TEMPORAL HOODING

## 2024-11-20 PROCEDURE — 77067 SCR MAMMO BI INCL CAD: CPT

## 2024-11-20 PROCEDURE — 77063 BREAST TOMOSYNTHESIS BI: CPT

## 2024-11-20 PROCEDURE — 76641 ULTRASOUND BREAST COMPLETE: CPT

## 2024-12-19 ENCOUNTER — APPOINTMENT (OUTPATIENT)
Dept: INTERNAL MEDICINE | Facility: CLINIC | Age: 74
End: 2024-12-19
Payer: MEDICARE

## 2024-12-19 VITALS
HEIGHT: 63 IN | HEART RATE: 83 BPM | WEIGHT: 185 LBS | OXYGEN SATURATION: 96 % | SYSTOLIC BLOOD PRESSURE: 115 MMHG | TEMPERATURE: 97.5 F | DIASTOLIC BLOOD PRESSURE: 73 MMHG | BODY MASS INDEX: 32.78 KG/M2

## 2024-12-19 DIAGNOSIS — E78.2 MIXED HYPERLIPIDEMIA: ICD-10-CM

## 2024-12-19 DIAGNOSIS — M35.3 POLYMYALGIA RHEUMATICA: ICD-10-CM

## 2024-12-19 DIAGNOSIS — E11.9 TYPE 2 DIABETES MELLITUS W/OUT COMPLICATIONS: ICD-10-CM

## 2024-12-19 DIAGNOSIS — C50.912 MALIGNANT NEOPLASM OF UNSPECIFIED SITE OF LEFT FEMALE BREAST: ICD-10-CM

## 2024-12-19 LAB — HBA1C MFR BLD HPLC: 7

## 2024-12-19 PROCEDURE — 99214 OFFICE O/P EST MOD 30 MIN: CPT

## 2024-12-19 PROCEDURE — G2211 COMPLEX E/M VISIT ADD ON: CPT

## 2024-12-19 PROCEDURE — 83036 HEMOGLOBIN GLYCOSYLATED A1C: CPT | Mod: QW

## 2025-01-08 ENCOUNTER — APPOINTMENT (OUTPATIENT)
Dept: RHEUMATOLOGY | Facility: CLINIC | Age: 75
End: 2025-01-08
Payer: MEDICARE

## 2025-01-08 VITALS
RESPIRATION RATE: 16 BRPM | SYSTOLIC BLOOD PRESSURE: 128 MMHG | BODY MASS INDEX: 32.25 KG/M2 | WEIGHT: 182 LBS | HEART RATE: 91 BPM | OXYGEN SATURATION: 98 % | DIASTOLIC BLOOD PRESSURE: 76 MMHG | HEIGHT: 63 IN

## 2025-01-08 PROCEDURE — 99214 OFFICE O/P EST MOD 30 MIN: CPT

## 2025-01-08 PROCEDURE — G2211 COMPLEX E/M VISIT ADD ON: CPT

## 2025-01-10 ENCOUNTER — TRANSCRIPTION ENCOUNTER (OUTPATIENT)
Age: 75
End: 2025-01-10

## 2025-01-10 LAB
ALBUMIN SERPL ELPH-MCNC: 4.6 G/DL
ALP BLD-CCNC: 79 U/L
ALT SERPL-CCNC: 25 U/L
ANION GAP SERPL CALC-SCNC: 11 MMOL/L
AST SERPL-CCNC: 24 U/L
BASOPHILS # BLD AUTO: 0.05 K/UL
BASOPHILS NFR BLD AUTO: 0.9 %
BILIRUB SERPL-MCNC: 0.5 MG/DL
BUN SERPL-MCNC: 14 MG/DL
CALCIUM SERPL-MCNC: 10.2 MG/DL
CHLORIDE SERPL-SCNC: 104 MMOL/L
CO2 SERPL-SCNC: 27 MMOL/L
CREAT SERPL-MCNC: 0.79 MG/DL
CRP SERPL-MCNC: <3 MG/L
EGFR: 78 ML/MIN/1.73M2
EOSINOPHIL # BLD AUTO: 0.11 K/UL
EOSINOPHIL NFR BLD AUTO: 1.9 %
ERYTHROCYTE [SEDIMENTATION RATE] IN BLOOD BY WESTERGREN METHOD: 23 MM/HR
GLUCOSE SERPL-MCNC: 118 MG/DL
HCT VFR BLD CALC: 48.7 %
HGB BLD-MCNC: 15.8 G/DL
IL6 SERPL-MCNC: <2.5 PG/ML
IMM GRANULOCYTES NFR BLD AUTO: 0.2 %
LYMPHOCYTES # BLD AUTO: 2.07 K/UL
LYMPHOCYTES NFR BLD AUTO: 35.3 %
MAN DIFF?: NORMAL
MCHC RBC-ENTMCNC: 29.8 PG
MCHC RBC-ENTMCNC: 32.4 G/DL
MCV RBC AUTO: 91.7 FL
MONOCYTES # BLD AUTO: 0.54 K/UL
MONOCYTES NFR BLD AUTO: 9.2 %
NEUTROPHILS # BLD AUTO: 3.09 K/UL
NEUTROPHILS NFR BLD AUTO: 52.5 %
PLATELET # BLD AUTO: 191 K/UL
POTASSIUM SERPL-SCNC: 4.7 MMOL/L
PROT SERPL-MCNC: 6.9 G/DL
RBC # BLD: 5.31 M/UL
RBC # FLD: 13.3 %
SODIUM SERPL-SCNC: 142 MMOL/L
WBC # FLD AUTO: 5.87 K/UL

## 2025-02-26 ENCOUNTER — RX RENEWAL (OUTPATIENT)
Age: 75
End: 2025-02-26

## 2025-03-04 NOTE — H&P PST ADULT - NS MD HP INPLANTS MED DEV
Medication Scribe Admission Medication History    Admission medication history is complete. The information provided in this note is only as accurate as the sources available at the time of the update.    Information Source(s): Patient via phone    Pertinent Information: Med scribe review post RN review. Confirmed missing last dose for pramipexole which he reports he took 3/2/25 afternoon. He usually takes his evening medications at 3 PM. Reconcile list addressed and cleared out.     Changes made to PTA medication list:  Added: None  Deleted: None  Changed: None    Allergies reviewed with patient and updates made in EHR: yes    Medication History Completed By: AISLINN ARITA 3/4/2025 10:11 AM    PTA Med List   Medication Sig Last Dose/Taking    acetaminophen (TYLENOL) 325 MG tablet Take 650 mg by mouth every 4 hours as needed for mild pain. Past Week    albuterol (PROAIR HFA/PROVENTIL HFA/VENTOLIN HFA) 108 (90 Base) MCG/ACT inhaler Inhale 2 puffs into the lungs every 4 hours as needed for shortness of breath, wheezing or cough. 3/2/2025    albuterol (PROVENTIL) (2.5 MG/3ML) 0.083% neb solution NEBULIZE CONTENTS OF ONE VIAL INTO THE LUNGS FOUR TIMES A DAY 3/2/2025 at  6:00 PM    apixaban ANTICOAGULANT (ELIQUIS) 5 MG tablet Take 1 tablet (5 mg) by mouth 2 times daily 3/2/2025 at  3:00 PM    CALCIUM PO Take 1 tablet by mouth daily 3/2/2025 at  8:00 AM    fluticasone (FLONASE) 50 MCG/ACT nasal spray INSTILL 1 SPRAY INTO BOTH NOSTRILS ONCE DAILY 3/2/2025 at  8:00 AM    furosemide (LASIX) 20 MG tablet Take 1 tablet (20 mg) by mouth 2 times daily. 3/2/2025 at  3:00 PM    gabapentin (NEURONTIN) 300 MG capsule Take 1 capsule (300 mg) by mouth at bedtime. 3/1/2025 at  9:00 PM    guaiFENesin-dextromethorphan (ROBITUSSIN DM) 100-10 MG/5ML syrup Take 10 mLs by mouth every 4 hours as needed for cough Past Week    ipratropium - albuterol 0.5 mg/2.5 mg/3 mL (DUONEB) 0.5-2.5 (3) MG/3ML neb solution NEBULIZE CONTENTS OF ONE VIAL EVERY  6 HOURS AS NEEDED FOR SHORTNESS OF BREATH / DYSPNEA  OR WHEEZING 3/2/2025 at  8:00 PM    levETIRAcetam (KEPPRA) 500 MG tablet Take 1 tablet (500 mg) by mouth every evening. 1500 Automatic Timed Dispenser 3/2/2025 at  3:00 PM    levothyroxine (SYNTHROID/LEVOTHROID) 75 MCG tablet TAKE 1 TABLET (75 MCG) BY MOUTH DAILY 3/2/2025 at  6:00 AM    LORazepam (ATIVAN) 1 MG tablet Take 1 tablet (1 mg) by mouth every 8 hours as needed for anxiety. 3/1/2025 at  9:00 PM    metoprolol succinate ER (TOPROL XL) 100 MG 24 hr tablet Take 1 tablet (100 mg) by mouth daily 3/2/2025 at  8:00 AM    mometasone-formoterol (DULERA) 200-5 MCG/ACT inhaler INHALE 2 PUFFS INTO THE LUNGS 2 TIMES DAILY 3/2/2025 at  8:00 AM    montelukast (SINGULAIR) 10 MG tablet TAKE 1 TABLET (10 MG) BY MOUTH AT BEDTIME 3/2/2025 at  3:00 PM    Multiple Vitamins-Minerals (ONE DAILY MENS [facility]) TABS Take 1 tablet by mouth daily. 3/2/2025 at  8:00 AM    pramipexole (MIRAPEX) 0.5 MG tablet TAKE ONE TABLET (0.5 MG) BY MOUTH AT BEDTIME 3/2/2025 at  3:00 PM    predniSONE (DELTASONE) 10 MG tablet Take 4 tabs daily for 5 days,  take 2 tabs daily for 3 days, take 1 tab daily for 3 days, take half a tab for 3 days. 3/2/2025 at  8:00 AM    predniSONE (DELTASONE) 5 MG tablet 3 pills a day Past Week    tamsulosin (FLOMAX) 0.4 MG capsule TAKE ONE CAPSULE BY MOUTH ONCE DAILY 3/2/2025 at  8:00 AM    umeclidinium (INCRUSE ELLIPTA) 62.5 MCG/ACT inhaler Inhale 1 puff into the lungs daily. 3/2/2025 at  3:00 PM    [DISCONTINUED] cholecalciferol (VITAMIN D3) 125 mcg (5000 units) capsule TAKE ONE CAPSULE BY MOUTH EVERY MORNING 3/2/2025 at  8:00 AM    [DISCONTINUED] pramipexole (MIRAPEX) 0.5 MG tablet TAKE 1 TABLET (0.5 MG) BY MOUTH AT BEDTIME 3/2/2025 at  3:00 PM       None

## 2025-04-03 ENCOUNTER — OUTPATIENT (OUTPATIENT)
Dept: OUTPATIENT SERVICES | Facility: HOSPITAL | Age: 75
LOS: 1 days | End: 2025-04-03
Payer: MEDICARE

## 2025-04-03 ENCOUNTER — APPOINTMENT (OUTPATIENT)
Dept: MRI IMAGING | Facility: IMAGING CENTER | Age: 75
End: 2025-04-03
Payer: MEDICARE

## 2025-04-03 DIAGNOSIS — Z00.8 ENCOUNTER FOR OTHER GENERAL EXAMINATION: ICD-10-CM

## 2025-04-03 PROCEDURE — A9585: CPT

## 2025-04-03 PROCEDURE — C8908: CPT | Mod: MH

## 2025-04-03 PROCEDURE — C8937: CPT

## 2025-04-03 PROCEDURE — 77049 MRI BREAST C-+ W/CAD BI: CPT | Mod: 26

## 2025-04-07 ENCOUNTER — APPOINTMENT (OUTPATIENT)
Dept: SURGERY | Facility: CLINIC | Age: 75
End: 2025-04-07
Payer: MEDICARE

## 2025-04-07 PROCEDURE — 99214K: CUSTOM

## 2025-04-11 ENCOUNTER — APPOINTMENT (OUTPATIENT)
Dept: ULTRASOUND IMAGING | Facility: IMAGING CENTER | Age: 75
End: 2025-04-11
Payer: MEDICARE

## 2025-04-11 ENCOUNTER — RESULT REVIEW (OUTPATIENT)
Age: 75
End: 2025-04-11

## 2025-04-11 ENCOUNTER — APPOINTMENT (OUTPATIENT)
Dept: MRI IMAGING | Facility: IMAGING CENTER | Age: 75
End: 2025-04-11
Payer: MEDICARE

## 2025-04-11 ENCOUNTER — OUTPATIENT (OUTPATIENT)
Dept: OUTPATIENT SERVICES | Facility: HOSPITAL | Age: 75
LOS: 1 days | End: 2025-04-11
Payer: MEDICARE

## 2025-04-11 DIAGNOSIS — Z00.8 ENCOUNTER FOR OTHER GENERAL EXAMINATION: ICD-10-CM

## 2025-04-11 PROCEDURE — 88342 IMHCHEM/IMCYTCHM 1ST ANTB: CPT | Mod: 26,59

## 2025-04-11 PROCEDURE — 88305 TISSUE EXAM BY PATHOLOGIST: CPT | Mod: 26

## 2025-04-11 PROCEDURE — 88305 TISSUE EXAM BY PATHOLOGIST: CPT

## 2025-04-11 PROCEDURE — 38505 NEEDLE BIOPSY LYMPH NODES: CPT

## 2025-04-11 PROCEDURE — 19085 BX BREAST 1ST LESION MR IMAG: CPT | Mod: RT

## 2025-04-11 PROCEDURE — 77066 DX MAMMO INCL CAD BI: CPT | Mod: 26

## 2025-04-11 PROCEDURE — 77065 DX MAMMO INCL CAD UNI: CPT | Mod: 26,RT

## 2025-04-11 PROCEDURE — 88342 IMHCHEM/IMCYTCHM 1ST ANTB: CPT

## 2025-04-11 PROCEDURE — 88377 M/PHMTRC ALYS ISHQUANT/SEMIQ: CPT

## 2025-04-11 PROCEDURE — 88360 TUMOR IMMUNOHISTOCHEM/MANUAL: CPT | Mod: 26

## 2025-04-11 PROCEDURE — 77065 DX MAMMO INCL CAD UNI: CPT

## 2025-04-11 PROCEDURE — 38505 NEEDLE BIOPSY LYMPH NODES: CPT | Mod: RT

## 2025-04-11 PROCEDURE — 19083 BX BREAST 1ST LESION US IMAG: CPT | Mod: LT

## 2025-04-11 PROCEDURE — 88377 M/PHMTRC ALYS ISHQUANT/SEMIQ: CPT | Mod: 26

## 2025-04-11 PROCEDURE — 76942 ECHO GUIDE FOR BIOPSY: CPT

## 2025-04-11 PROCEDURE — 77065 DX MAMMO INCL CAD UNI: CPT | Mod: 26,LT

## 2025-04-11 PROCEDURE — A4648: CPT

## 2025-04-11 PROCEDURE — 19083 BX BREAST 1ST LESION US IMAG: CPT

## 2025-04-11 PROCEDURE — 19085 BX BREAST 1ST LESION MR IMAG: CPT

## 2025-04-11 PROCEDURE — 88360 TUMOR IMMUNOHISTOCHEM/MANUAL: CPT | Mod: 26,XP

## 2025-04-11 PROCEDURE — A9585: CPT

## 2025-04-11 PROCEDURE — 88360 TUMOR IMMUNOHISTOCHEM/MANUAL: CPT

## 2025-04-18 ENCOUNTER — APPOINTMENT (OUTPATIENT)
Dept: CT IMAGING | Facility: IMAGING CENTER | Age: 75
End: 2025-04-18
Payer: MEDICARE

## 2025-04-18 ENCOUNTER — APPOINTMENT (OUTPATIENT)
Dept: NUCLEAR MEDICINE | Facility: IMAGING CENTER | Age: 75
End: 2025-04-18
Payer: MEDICARE

## 2025-04-18 ENCOUNTER — OUTPATIENT (OUTPATIENT)
Dept: OUTPATIENT SERVICES | Facility: HOSPITAL | Age: 75
LOS: 1 days | End: 2025-04-18
Payer: MEDICARE

## 2025-04-18 DIAGNOSIS — Z00.8 ENCOUNTER FOR OTHER GENERAL EXAMINATION: ICD-10-CM

## 2025-04-18 PROCEDURE — 74177 CT ABD & PELVIS W/CONTRAST: CPT | Mod: 26

## 2025-04-18 PROCEDURE — 78306 BONE IMAGING WHOLE BODY: CPT | Mod: 26

## 2025-04-18 PROCEDURE — 71260 CT THORAX DX C+: CPT | Mod: MH

## 2025-04-18 PROCEDURE — 71260 CT THORAX DX C+: CPT | Mod: 26

## 2025-04-18 PROCEDURE — A9561: CPT

## 2025-04-18 PROCEDURE — 78830 RP LOCLZJ TUM SPECT W/CT 1: CPT | Mod: MH

## 2025-04-18 PROCEDURE — 78306 BONE IMAGING WHOLE BODY: CPT | Mod: MH

## 2025-04-18 PROCEDURE — 78830 RP LOCLZJ TUM SPECT W/CT 1: CPT | Mod: 26

## 2025-04-18 PROCEDURE — 74177 CT ABD & PELVIS W/CONTRAST: CPT | Mod: MH

## 2025-04-23 ENCOUNTER — OUTPATIENT (OUTPATIENT)
Dept: OUTPATIENT SERVICES | Facility: HOSPITAL | Age: 75
LOS: 1 days | End: 2025-04-23
Payer: MEDICARE

## 2025-04-23 ENCOUNTER — NON-APPOINTMENT (OUTPATIENT)
Age: 75
End: 2025-04-23

## 2025-04-23 ENCOUNTER — APPOINTMENT (OUTPATIENT)
Dept: MRI IMAGING | Facility: IMAGING CENTER | Age: 75
End: 2025-04-23
Payer: MEDICARE

## 2025-04-23 DIAGNOSIS — Z00.8 ENCOUNTER FOR OTHER GENERAL EXAMINATION: ICD-10-CM

## 2025-04-23 PROCEDURE — A9585: CPT

## 2025-04-23 PROCEDURE — 70553 MRI BRAIN STEM W/O & W/DYE: CPT | Mod: 26

## 2025-04-23 PROCEDURE — 70553 MRI BRAIN STEM W/O & W/DYE: CPT

## 2025-04-24 ENCOUNTER — APPOINTMENT (OUTPATIENT)
Dept: INTERNAL MEDICINE | Facility: CLINIC | Age: 75
End: 2025-04-24
Payer: MEDICARE

## 2025-04-24 VITALS
SYSTOLIC BLOOD PRESSURE: 116 MMHG | BODY MASS INDEX: 31.54 KG/M2 | HEART RATE: 78 BPM | HEIGHT: 63 IN | OXYGEN SATURATION: 98 % | DIASTOLIC BLOOD PRESSURE: 76 MMHG | WEIGHT: 178 LBS | TEMPERATURE: 97.5 F

## 2025-04-24 DIAGNOSIS — E11.9 TYPE 2 DIABETES MELLITUS W/OUT COMPLICATIONS: ICD-10-CM

## 2025-04-24 DIAGNOSIS — E78.2 MIXED HYPERLIPIDEMIA: ICD-10-CM

## 2025-04-24 DIAGNOSIS — E07.9 DISORDER OF THYROID, UNSPECIFIED: ICD-10-CM

## 2025-04-24 PROCEDURE — 83036 HEMOGLOBIN GLYCOSYLATED A1C: CPT | Mod: QW

## 2025-04-24 PROCEDURE — 99214 OFFICE O/P EST MOD 30 MIN: CPT

## 2025-04-24 PROCEDURE — G2211 COMPLEX E/M VISIT ADD ON: CPT

## 2025-04-30 ENCOUNTER — APPOINTMENT (OUTPATIENT)
Dept: NUCLEAR MEDICINE | Facility: CLINIC | Age: 75
End: 2025-04-30
Payer: MEDICARE

## 2025-04-30 ENCOUNTER — RESULT REVIEW (OUTPATIENT)
Age: 75
End: 2025-04-30

## 2025-04-30 PROCEDURE — A9591: CPT

## 2025-04-30 PROCEDURE — 78816 PET IMAGE W/CT FULL BODY: CPT | Mod: PS

## 2025-04-30 PROCEDURE — 78999 UNLISTED MISC PX DX NUC MED: CPT

## 2025-05-01 ENCOUNTER — OUTPATIENT (OUTPATIENT)
Dept: OUTPATIENT SERVICES | Facility: HOSPITAL | Age: 75
LOS: 1 days | Discharge: HOME | End: 2025-05-01

## 2025-05-01 DIAGNOSIS — C50.912 MALIGNANT NEOPLASM OF UNSPECIFIED SITE OF LEFT FEMALE BREAST: ICD-10-CM

## 2025-05-02 ENCOUNTER — RESULT REVIEW (OUTPATIENT)
Age: 75
End: 2025-05-02

## 2025-05-02 ENCOUNTER — APPOINTMENT (OUTPATIENT)
Dept: HEMATOLOGY ONCOLOGY | Facility: CLINIC | Age: 75
End: 2025-05-02
Payer: MEDICARE

## 2025-05-02 VITALS
SYSTOLIC BLOOD PRESSURE: 107 MMHG | OXYGEN SATURATION: 98 % | BODY MASS INDEX: 33.27 KG/M2 | DIASTOLIC BLOOD PRESSURE: 66 MMHG | HEART RATE: 94 BPM | TEMPERATURE: 97.3 F | RESPIRATION RATE: 16 BRPM | WEIGHT: 180.78 LBS | HEIGHT: 61.81 IN

## 2025-05-02 DIAGNOSIS — R93.0 ABNORMAL FINDINGS ON DIAGNOSTIC IMAGING OF SKULL AND HEAD, NOT ELSEWHERE CLASSIFIED: ICD-10-CM

## 2025-05-02 DIAGNOSIS — Z17.0 MALIGNANT NEOPLASM OF AXILLARY TAIL OF RIGHT FEMALE BREAST: ICD-10-CM

## 2025-05-02 DIAGNOSIS — C50.912 MALIGNANT NEOPLASM OF UNSPECIFIED SITE OF LEFT FEMALE BREAST: ICD-10-CM

## 2025-05-02 DIAGNOSIS — M35.3 POLYMYALGIA RHEUMATICA: ICD-10-CM

## 2025-05-02 DIAGNOSIS — C50.611 MALIGNANT NEOPLASM OF AXILLARY TAIL OF RIGHT FEMALE BREAST: ICD-10-CM

## 2025-05-02 LAB
BASOPHILS # BLD AUTO: 0.04 K/UL — SIGNIFICANT CHANGE UP (ref 0–0.2)
BASOPHILS NFR BLD AUTO: 0.7 % — SIGNIFICANT CHANGE UP (ref 0–2)
EOSINOPHIL # BLD AUTO: 0.08 K/UL — SIGNIFICANT CHANGE UP (ref 0–0.5)
EOSINOPHIL NFR BLD AUTO: 1.4 % — SIGNIFICANT CHANGE UP (ref 0–6)
HCT VFR BLD CALC: 47.9 % — HIGH (ref 34.5–45)
HGB BLD-MCNC: 16 G/DL — HIGH (ref 11.5–15.5)
IMM GRANULOCYTES NFR BLD AUTO: 0.2 % — SIGNIFICANT CHANGE UP (ref 0–0.9)
LYMPHOCYTES # BLD AUTO: 1.73 K/UL — SIGNIFICANT CHANGE UP (ref 1–3.3)
LYMPHOCYTES # BLD AUTO: 31.1 % — SIGNIFICANT CHANGE UP (ref 13–44)
MCHC RBC-ENTMCNC: 29.8 PG — SIGNIFICANT CHANGE UP (ref 27–34)
MCHC RBC-ENTMCNC: 33.4 G/DL — SIGNIFICANT CHANGE UP (ref 32–36)
MCV RBC AUTO: 89.2 FL — SIGNIFICANT CHANGE UP (ref 80–100)
MONOCYTES # BLD AUTO: 0.57 K/UL — SIGNIFICANT CHANGE UP (ref 0–0.9)
MONOCYTES NFR BLD AUTO: 10.3 % — SIGNIFICANT CHANGE UP (ref 2–14)
NEUTROPHILS # BLD AUTO: 3.13 K/UL — SIGNIFICANT CHANGE UP (ref 1.8–7.4)
NEUTROPHILS NFR BLD AUTO: 56.3 % — SIGNIFICANT CHANGE UP (ref 43–77)
NRBC BLD AUTO-RTO: 0 /100 WBCS — SIGNIFICANT CHANGE UP (ref 0–0)
PLATELET # BLD AUTO: 174 K/UL — SIGNIFICANT CHANGE UP (ref 150–400)
RBC # BLD: 5.37 M/UL — HIGH (ref 3.8–5.2)
RBC # FLD: 12.9 % — SIGNIFICANT CHANGE UP (ref 10.3–14.5)
WBC # BLD: 5.56 K/UL — SIGNIFICANT CHANGE UP (ref 3.8–10.5)
WBC # FLD AUTO: 5.56 K/UL — SIGNIFICANT CHANGE UP (ref 3.8–10.5)

## 2025-05-02 PROCEDURE — 99205 OFFICE O/P NEW HI 60 MIN: CPT

## 2025-05-02 PROCEDURE — G2211 COMPLEX E/M VISIT ADD ON: CPT

## 2025-05-03 LAB
25(OH)D3 SERPL-MCNC: 54.7 NG/ML
ALBUMIN SERPL ELPH-MCNC: 4.7 G/DL
ALP BLD-CCNC: 81 U/L
ALT SERPL-CCNC: 33 U/L
ANION GAP SERPL CALC-SCNC: 18 MMOL/L
AST SERPL-CCNC: 30 U/L
BILIRUB SERPL-MCNC: 0.4 MG/DL
BUN SERPL-MCNC: 16 MG/DL
CALCIUM SERPL-MCNC: 9.8 MG/DL
CALCIUM SERPL-MCNC: 9.8 MG/DL
CHLORIDE SERPL-SCNC: 106 MMOL/L
CO2 SERPL-SCNC: 21 MMOL/L
CREAT SERPL-MCNC: 0.67 MG/DL
EGFRCR SERPLBLD CKD-EPI 2021: 91 ML/MIN/1.73M2
GLUCOSE SERPL-MCNC: 113 MG/DL
PARATHYROID HORMONE INTACT: 25 PG/ML
PHOSPHATE SERPL-MCNC: 3.7 MG/DL
POTASSIUM SERPL-SCNC: 4.1 MMOL/L
PROT SERPL-MCNC: 6.9 G/DL
SODIUM SERPL-SCNC: 145 MMOL/L

## 2025-05-04 PROBLEM — C50.611 MALIGNANT NEOPLASM OF AXILLARY TAIL OF RIGHT BREAST IN FEMALE, ESTROGEN RECEPTOR POSITIVE: Status: ACTIVE | Noted: 2025-05-04

## 2025-05-05 LAB
ALBUMIN MFR SERPL ELPH: 61.8 %
ALBUMIN SERPL-MCNC: 4.3 G/DL
ALBUMIN/GLOB SERPL: 1.7 RATIO
ALPHA1 GLOB MFR SERPL ELPH: 3.6 %
ALPHA1 GLOB SERPL ELPH-MCNC: 0.2 G/DL
ALPHA2 GLOB MFR SERPL ELPH: 10.3 %
ALPHA2 GLOB SERPL ELPH-MCNC: 0.7 G/DL
B-GLOBULIN MFR SERPL ELPH: 11.2 %
B-GLOBULIN SERPL ELPH-MCNC: 0.8 G/DL
DEPRECATED KAPPA LC FREE/LAMBDA SER: 1.09 RATIO
GAMMA GLOB FLD ELPH-MCNC: 0.9 G/DL
GAMMA GLOB MFR SERPL ELPH: 13.1 %
IGA SER QL IEP: 205 MG/DL
IGG SER QL IEP: 845 MG/DL
IGM SER QL IEP: 153 MG/DL
INTERPRETATION SERPL IEP-IMP: NORMAL
KAPPA LC CSF-MCNC: 1.17 MG/DL
KAPPA LC SERPL-MCNC: 1.27 MG/DL
M PROTEIN SPEC IFE-MCNC: NORMAL
PROT SERPL-MCNC: 6.9 G/DL
PROT SERPL-MCNC: 6.9 G/DL

## 2025-05-11 LAB — PTH RELATED PROT SERPL-MCNC: <2 PMOL/L

## 2025-05-15 ENCOUNTER — APPOINTMENT (OUTPATIENT)
Dept: HEMATOLOGY ONCOLOGY | Facility: CLINIC | Age: 75
End: 2025-05-15
Payer: MEDICARE

## 2025-05-15 ENCOUNTER — NON-APPOINTMENT (OUTPATIENT)
Age: 75
End: 2025-05-15

## 2025-05-15 VITALS
DIASTOLIC BLOOD PRESSURE: 64 MMHG | HEART RATE: 84 BPM | WEIGHT: 181.88 LBS | OXYGEN SATURATION: 97 % | BODY MASS INDEX: 33.47 KG/M2 | RESPIRATION RATE: 16 BRPM | SYSTOLIC BLOOD PRESSURE: 113 MMHG | TEMPERATURE: 97.5 F

## 2025-05-15 DIAGNOSIS — C50.611 MALIGNANT NEOPLASM OF AXILLARY TAIL OF RIGHT FEMALE BREAST: ICD-10-CM

## 2025-05-15 DIAGNOSIS — Z17.0 MALIGNANT NEOPLASM OF AXILLARY TAIL OF RIGHT FEMALE BREAST: ICD-10-CM

## 2025-05-15 PROCEDURE — 99215 OFFICE O/P EST HI 40 MIN: CPT

## 2025-05-15 PROCEDURE — 93000 ELECTROCARDIOGRAM COMPLETE: CPT

## 2025-05-15 PROCEDURE — G2211 COMPLEX E/M VISIT ADD ON: CPT

## 2025-05-16 RX ORDER — RIBOCICLIB 200 MG/1
200 TABLET, FILM COATED ORAL
Qty: 1 | Refills: 3 | Status: ACTIVE | COMMUNITY
Start: 2025-05-16 | End: 1900-01-01

## 2025-05-16 RX ORDER — ANASTROZOLE TABLETS 1 MG/1
1 TABLET ORAL
Qty: 90 | Refills: 1 | Status: ACTIVE | COMMUNITY
Start: 2025-05-16 | End: 1900-01-01

## 2025-05-19 ENCOUNTER — NON-APPOINTMENT (OUTPATIENT)
Age: 75
End: 2025-05-19

## 2025-05-19 ENCOUNTER — APPOINTMENT (OUTPATIENT)
Dept: OBGYN | Facility: CLINIC | Age: 75
End: 2025-05-19
Payer: MEDICARE

## 2025-05-19 VITALS
SYSTOLIC BLOOD PRESSURE: 134 MMHG | DIASTOLIC BLOOD PRESSURE: 80 MMHG | BODY MASS INDEX: 33.49 KG/M2 | HEART RATE: 93 BPM | HEIGHT: 62 IN | WEIGHT: 182 LBS

## 2025-05-19 DIAGNOSIS — R93.89 ABNORMAL FINDINGS ON DIAGNOSTIC IMAGING OF OTHER SPECIFIED BODY STRUCTURES: ICD-10-CM

## 2025-05-19 PROCEDURE — G0101: CPT

## 2025-05-21 LAB — HPV HIGH+LOW RISK DNA PNL CVX: NOT DETECTED

## 2025-05-23 LAB — CYTOLOGY CVX/VAG DOC THIN PREP: NORMAL

## 2025-05-27 ENCOUNTER — APPOINTMENT (OUTPATIENT)
Dept: SURGERY | Facility: HOSPITAL | Age: 75
End: 2025-05-27

## 2025-06-02 ENCOUNTER — APPOINTMENT (OUTPATIENT)
Dept: OBGYN | Facility: CLINIC | Age: 75
End: 2025-06-02
Payer: MEDICARE

## 2025-06-02 ENCOUNTER — ASOB RESULT (OUTPATIENT)
Age: 75
End: 2025-06-02

## 2025-06-02 VITALS
HEART RATE: 81 BPM | DIASTOLIC BLOOD PRESSURE: 74 MMHG | SYSTOLIC BLOOD PRESSURE: 151 MMHG | BODY MASS INDEX: 33.31 KG/M2 | WEIGHT: 181 LBS | HEIGHT: 62 IN

## 2025-06-02 DIAGNOSIS — R93.89 ABNORMAL FINDINGS ON DIAGNOSTIC IMAGING OF OTHER SPECIFIED BODY STRUCTURES: ICD-10-CM

## 2025-06-02 PROCEDURE — 58100 BIOPSY OF UTERUS LINING: CPT

## 2025-06-02 PROCEDURE — 99213 OFFICE O/P EST LOW 20 MIN: CPT | Mod: 25

## 2025-06-02 PROCEDURE — 99459 PELVIC EXAMINATION: CPT

## 2025-06-02 PROCEDURE — 76830 TRANSVAGINAL US NON-OB: CPT

## 2025-06-02 PROCEDURE — 76857 US EXAM PELVIC LIMITED: CPT

## 2025-06-05 ENCOUNTER — RESULT REVIEW (OUTPATIENT)
Age: 75
End: 2025-06-05

## 2025-06-05 ENCOUNTER — APPOINTMENT (OUTPATIENT)
Dept: HEMATOLOGY ONCOLOGY | Facility: CLINIC | Age: 75
End: 2025-06-05
Payer: MEDICARE

## 2025-06-05 VITALS
WEIGHT: 181 LBS | OXYGEN SATURATION: 98 % | RESPIRATION RATE: 16 BRPM | TEMPERATURE: 97.5 F | BODY MASS INDEX: 33.1 KG/M2 | SYSTOLIC BLOOD PRESSURE: 115 MMHG | HEART RATE: 79 BPM | DIASTOLIC BLOOD PRESSURE: 71 MMHG

## 2025-06-05 DIAGNOSIS — Z79.811 LONG TERM (CURRENT) USE OF AROMATASE INHIBITORS: ICD-10-CM

## 2025-06-05 DIAGNOSIS — C50.912 MALIGNANT NEOPLASM OF UNSPECIFIED SITE OF LEFT FEMALE BREAST: ICD-10-CM

## 2025-06-05 DIAGNOSIS — Z79.899 OTHER LONG TERM (CURRENT) DRUG THERAPY: ICD-10-CM

## 2025-06-05 LAB
ALBUMIN SERPL ELPH-MCNC: 4.3 G/DL
ALP BLD-CCNC: 78 U/L
ALT SERPL-CCNC: 27 U/L
ANION GAP SERPL CALC-SCNC: 12 MMOL/L
AST SERPL-CCNC: 28 U/L
BASOPHILS # BLD AUTO: 0.04 K/UL — SIGNIFICANT CHANGE UP (ref 0–0.2)
BASOPHILS NFR BLD AUTO: 1.7 % — SIGNIFICANT CHANGE UP (ref 0–2)
BILIRUB SERPL-MCNC: 0.6 MG/DL
BUN SERPL-MCNC: 15 MG/DL
CALCIUM SERPL-MCNC: 9.4 MG/DL
CHLORIDE SERPL-SCNC: 104 MMOL/L
CO2 SERPL-SCNC: 24 MMOL/L
CREAT SERPL-MCNC: 0.84 MG/DL
EGFRCR SERPLBLD CKD-EPI 2021: 72 ML/MIN/1.73M2
EOSINOPHIL # BLD AUTO: 0.08 K/UL — SIGNIFICANT CHANGE UP (ref 0–0.5)
EOSINOPHIL NFR BLD AUTO: 3.4 % — SIGNIFICANT CHANGE UP (ref 0–6)
GLUCOSE SERPL-MCNC: 157 MG/DL
HCT VFR BLD CALC: 43.4 % — SIGNIFICANT CHANGE UP (ref 34.5–45)
HGB BLD-MCNC: 14.4 G/DL — SIGNIFICANT CHANGE UP (ref 11.5–15.5)
IMM GRANULOCYTES NFR BLD AUTO: 0.4 % — SIGNIFICANT CHANGE UP (ref 0–0.9)
LYMPHOCYTES # BLD AUTO: 0.82 K/UL — LOW (ref 1–3.3)
LYMPHOCYTES # BLD AUTO: 34.9 % — SIGNIFICANT CHANGE UP (ref 13–44)
MCHC RBC-ENTMCNC: 29.9 PG — SIGNIFICANT CHANGE UP (ref 27–34)
MCHC RBC-ENTMCNC: 33.2 G/DL — SIGNIFICANT CHANGE UP (ref 32–36)
MCV RBC AUTO: 90 FL — SIGNIFICANT CHANGE UP (ref 80–100)
MONOCYTES # BLD AUTO: 0.19 K/UL — SIGNIFICANT CHANGE UP (ref 0–0.9)
MONOCYTES NFR BLD AUTO: 8.1 % — SIGNIFICANT CHANGE UP (ref 2–14)
NEUTROPHILS # BLD AUTO: 1.21 K/UL — LOW (ref 1.8–7.4)
NEUTROPHILS NFR BLD AUTO: 51.5 % — SIGNIFICANT CHANGE UP (ref 43–77)
NRBC BLD AUTO-RTO: 0 /100 WBCS — SIGNIFICANT CHANGE UP (ref 0–0)
PLAT MORPH BLD: NORMAL — SIGNIFICANT CHANGE UP
PLATELET # BLD AUTO: 154 K/UL — SIGNIFICANT CHANGE UP (ref 150–400)
POTASSIUM SERPL-SCNC: 4.7 MMOL/L
PROT SERPL-MCNC: 6.7 G/DL
RBC # BLD: 4.82 M/UL — SIGNIFICANT CHANGE UP (ref 3.8–5.2)
RBC # FLD: 13.1 % — SIGNIFICANT CHANGE UP (ref 10.3–14.5)
RBC BLD AUTO: SIGNIFICANT CHANGE UP
SODIUM SERPL-SCNC: 139 MMOL/L
WBC # BLD: 2.35 K/UL — LOW (ref 3.8–10.5)
WBC # FLD AUTO: 2.35 K/UL — LOW (ref 3.8–10.5)

## 2025-06-05 PROCEDURE — 99214 OFFICE O/P EST MOD 30 MIN: CPT

## 2025-06-07 LAB — CORE LAB BIOPSY: NORMAL

## 2025-06-10 LAB
CANCER AG27-29 SERPL-ACNC: 37.2 U/ML
CEA SERPL-MCNC: 2.6 NG/ML

## 2025-06-19 ENCOUNTER — APPOINTMENT (OUTPATIENT)
Dept: HEMATOLOGY ONCOLOGY | Facility: CLINIC | Age: 75
End: 2025-06-19
Payer: MEDICARE

## 2025-06-19 ENCOUNTER — RESULT REVIEW (OUTPATIENT)
Age: 75
End: 2025-06-19

## 2025-06-19 VITALS
OXYGEN SATURATION: 97 % | TEMPERATURE: 97.9 F | HEART RATE: 73 BPM | RESPIRATION RATE: 16 BRPM | SYSTOLIC BLOOD PRESSURE: 112 MMHG | DIASTOLIC BLOOD PRESSURE: 72 MMHG | WEIGHT: 178 LBS | BODY MASS INDEX: 32.56 KG/M2

## 2025-06-19 LAB
ALBUMIN SERPL ELPH-MCNC: 4.4 G/DL
ALP BLD-CCNC: 91 U/L
ALT SERPL-CCNC: 77 U/L
ANION GAP SERPL CALC-SCNC: 14 MMOL/L
AST SERPL-CCNC: 54 U/L
BASOPHILS # BLD AUTO: 0.11 K/UL — SIGNIFICANT CHANGE UP (ref 0–0.2)
BASOPHILS NFR BLD AUTO: 3.5 % — HIGH (ref 0–2)
BILIRUB SERPL-MCNC: 0.3 MG/DL
BUN SERPL-MCNC: 13 MG/DL
CALCIUM SERPL-MCNC: 9.9 MG/DL
CEA SERPL-MCNC: 2.7 NG/ML
CHLORIDE SERPL-SCNC: 105 MMOL/L
CO2 SERPL-SCNC: 22 MMOL/L
CREAT SERPL-MCNC: 0.7 MG/DL
EGFRCR SERPLBLD CKD-EPI 2021: 90 ML/MIN/1.73M2
EOSINOPHIL # BLD AUTO: 0.19 K/UL — SIGNIFICANT CHANGE UP (ref 0–0.5)
EOSINOPHIL NFR BLD AUTO: 6.1 % — HIGH (ref 0–6)
GLUCOSE SERPL-MCNC: 163 MG/DL
HCT VFR BLD CALC: 43.5 % — SIGNIFICANT CHANGE UP (ref 34.5–45)
HGB BLD-MCNC: 14.8 G/DL — SIGNIFICANT CHANGE UP (ref 11.5–15.5)
IMM GRANULOCYTES NFR BLD AUTO: 0.3 % — SIGNIFICANT CHANGE UP (ref 0–0.9)
LYMPHOCYTES # BLD AUTO: 1.08 K/UL — SIGNIFICANT CHANGE UP (ref 1–3.3)
LYMPHOCYTES # BLD AUTO: 34.7 % — SIGNIFICANT CHANGE UP (ref 13–44)
MCHC RBC-ENTMCNC: 30.6 PG — SIGNIFICANT CHANGE UP (ref 27–34)
MCHC RBC-ENTMCNC: 34 G/DL — SIGNIFICANT CHANGE UP (ref 32–36)
MCV RBC AUTO: 89.9 FL — SIGNIFICANT CHANGE UP (ref 80–100)
MONOCYTES # BLD AUTO: 0.6 K/UL — SIGNIFICANT CHANGE UP (ref 0–0.9)
MONOCYTES NFR BLD AUTO: 19.3 % — HIGH (ref 2–14)
NEUTROPHILS # BLD AUTO: 1.12 K/UL — LOW (ref 1.8–7.4)
NEUTROPHILS NFR BLD AUTO: 36.1 % — LOW (ref 43–77)
NRBC BLD AUTO-RTO: 0 /100 WBCS — SIGNIFICANT CHANGE UP (ref 0–0)
PLATELET # BLD AUTO: 197 K/UL — SIGNIFICANT CHANGE UP (ref 150–400)
POTASSIUM SERPL-SCNC: 4.3 MMOL/L
PROT SERPL-MCNC: 6.8 G/DL
RBC # BLD: 4.84 M/UL — SIGNIFICANT CHANGE UP (ref 3.8–5.2)
RBC # FLD: 15 % — HIGH (ref 10.3–14.5)
SODIUM SERPL-SCNC: 141 MMOL/L
WBC # BLD: 3.11 K/UL — LOW (ref 3.8–10.5)
WBC # FLD AUTO: 3.11 K/UL — LOW (ref 3.8–10.5)

## 2025-06-19 PROCEDURE — 99214 OFFICE O/P EST MOD 30 MIN: CPT

## 2025-06-19 RX ORDER — NYSTATIN 100000 [USP'U]/G
100000 POWDER TOPICAL TWICE DAILY
Qty: 1 | Refills: 3 | Status: ACTIVE | COMMUNITY
Start: 2025-06-19 | End: 1900-01-01

## 2025-06-20 LAB — CANCER AG27-29 SERPL-ACNC: 42.2 U/ML

## 2025-06-23 ENCOUNTER — APPOINTMENT (OUTPATIENT)
Dept: HEMATOLOGY ONCOLOGY | Facility: CLINIC | Age: 75
End: 2025-06-23

## 2025-06-28 ENCOUNTER — OUTPATIENT (OUTPATIENT)
Dept: OUTPATIENT SERVICES | Facility: HOSPITAL | Age: 75
LOS: 1 days | Discharge: HOME | End: 2025-06-28

## 2025-06-28 DIAGNOSIS — C50.912 MALIGNANT NEOPLASM OF UNSPECIFIED SITE OF LEFT FEMALE BREAST: ICD-10-CM

## 2025-07-03 ENCOUNTER — RESULT REVIEW (OUTPATIENT)
Age: 75
End: 2025-07-03

## 2025-07-03 ENCOUNTER — APPOINTMENT (OUTPATIENT)
Dept: HEMATOLOGY ONCOLOGY | Facility: CLINIC | Age: 75
End: 2025-07-03
Payer: MEDICARE

## 2025-07-03 ENCOUNTER — NON-APPOINTMENT (OUTPATIENT)
Age: 75
End: 2025-07-03

## 2025-07-03 VITALS
DIASTOLIC BLOOD PRESSURE: 74 MMHG | WEIGHT: 173.06 LBS | TEMPERATURE: 97.6 F | RESPIRATION RATE: 16 BRPM | SYSTOLIC BLOOD PRESSURE: 120 MMHG | BODY MASS INDEX: 31.65 KG/M2 | OXYGEN SATURATION: 97 % | HEART RATE: 81 BPM

## 2025-07-03 LAB
ALBUMIN SERPL ELPH-MCNC: 4.1 G/DL
ALP BLD-CCNC: 103 U/L
ALT SERPL-CCNC: 845 U/L
ANION GAP SERPL CALC-SCNC: 15 MMOL/L
AST SERPL-CCNC: 292 U/L
BASOPHILS # BLD AUTO: 0.07 K/UL — SIGNIFICANT CHANGE UP (ref 0–0.2)
BASOPHILS NFR BLD AUTO: 2.4 % — HIGH (ref 0–2)
BILIRUB SERPL-MCNC: 0.7 MG/DL
BUN SERPL-MCNC: 12 MG/DL
CALCIUM SERPL-MCNC: 9.1 MG/DL
CANCER AG27-29 SERPL-ACNC: 39.6 U/ML
CEA SERPL-MCNC: 2.9 NG/ML
CHLORIDE SERPL-SCNC: 104 MMOL/L
CO2 SERPL-SCNC: 22 MMOL/L
CREAT SERPL-MCNC: 0.84 MG/DL
DACRYOCYTES BLD QL SMEAR: SLIGHT — SIGNIFICANT CHANGE UP
EGFRCR SERPLBLD CKD-EPI 2021: 72 ML/MIN/1.73M2
EOSINOPHIL # BLD AUTO: 0.32 K/UL — SIGNIFICANT CHANGE UP (ref 0–0.5)
EOSINOPHIL NFR BLD AUTO: 11.2 % — HIGH (ref 0–6)
GLUCOSE SERPL-MCNC: 184 MG/DL
HCT VFR BLD CALC: 43 % — SIGNIFICANT CHANGE UP (ref 34.5–45)
HGB BLD-MCNC: 14.4 G/DL — SIGNIFICANT CHANGE UP (ref 11.5–15.5)
IMM GRANULOCYTES NFR BLD AUTO: 1 % — HIGH (ref 0–0.9)
LYMPHOCYTES # BLD AUTO: 0.82 K/UL — LOW (ref 1–3.3)
LYMPHOCYTES # BLD AUTO: 28.7 % — SIGNIFICANT CHANGE UP (ref 13–44)
MCHC RBC-ENTMCNC: 30.9 PG — SIGNIFICANT CHANGE UP (ref 27–34)
MCHC RBC-ENTMCNC: 33.5 G/DL — SIGNIFICANT CHANGE UP (ref 32–36)
MCV RBC AUTO: 92.3 FL — SIGNIFICANT CHANGE UP (ref 80–100)
MONOCYTES # BLD AUTO: 0.28 K/UL — SIGNIFICANT CHANGE UP (ref 0–0.9)
MONOCYTES NFR BLD AUTO: 9.8 % — SIGNIFICANT CHANGE UP (ref 2–14)
NEUTROPHILS # BLD AUTO: 1.34 K/UL — LOW (ref 1.8–7.4)
NEUTROPHILS NFR BLD AUTO: 46.9 % — SIGNIFICANT CHANGE UP (ref 43–77)
NRBC BLD AUTO-RTO: 0 /100 WBCS — SIGNIFICANT CHANGE UP (ref 0–0)
PLAT MORPH BLD: NORMAL — SIGNIFICANT CHANGE UP
PLATELET # BLD AUTO: 240 K/UL — SIGNIFICANT CHANGE UP (ref 150–400)
POIKILOCYTOSIS BLD QL AUTO: SLIGHT — SIGNIFICANT CHANGE UP
POTASSIUM SERPL-SCNC: 5.2 MMOL/L
PROT SERPL-MCNC: 6.6 G/DL
RBC # BLD: 4.66 M/UL — SIGNIFICANT CHANGE UP (ref 3.8–5.2)
RBC # FLD: 15.3 % — HIGH (ref 10.3–14.5)
RBC BLD AUTO: ABNORMAL
SODIUM SERPL-SCNC: 142 MMOL/L
WBC # BLD: 2.86 K/UL — LOW (ref 3.8–10.5)
WBC # FLD AUTO: 2.86 K/UL — LOW (ref 3.8–10.5)

## 2025-07-03 PROCEDURE — 99214 OFFICE O/P EST MOD 30 MIN: CPT

## 2025-07-03 PROCEDURE — G2211 COMPLEX E/M VISIT ADD ON: CPT

## 2025-07-07 ENCOUNTER — APPOINTMENT (OUTPATIENT)
Dept: RHEUMATOLOGY | Facility: CLINIC | Age: 75
End: 2025-07-07
Payer: MEDICARE

## 2025-07-07 VITALS
DIASTOLIC BLOOD PRESSURE: 73 MMHG | HEIGHT: 60 IN | OXYGEN SATURATION: 97 % | SYSTOLIC BLOOD PRESSURE: 117 MMHG | BODY MASS INDEX: 33.77 KG/M2 | HEART RATE: 80 BPM | WEIGHT: 172 LBS

## 2025-07-07 PROCEDURE — G2211 COMPLEX E/M VISIT ADD ON: CPT

## 2025-07-07 PROCEDURE — 99214 OFFICE O/P EST MOD 30 MIN: CPT

## 2025-07-08 ENCOUNTER — OFFICE (OUTPATIENT)
Facility: LOCATION | Age: 75
Setting detail: OPHTHALMOLOGY
End: 2025-07-08
Payer: MEDICARE

## 2025-07-08 DIAGNOSIS — H43.393: ICD-10-CM

## 2025-07-08 DIAGNOSIS — H40.013: ICD-10-CM

## 2025-07-08 DIAGNOSIS — H35.373: ICD-10-CM

## 2025-07-08 DIAGNOSIS — H35.363: ICD-10-CM

## 2025-07-08 DIAGNOSIS — H35.40: ICD-10-CM

## 2025-07-08 DIAGNOSIS — H16.423: ICD-10-CM

## 2025-07-08 DIAGNOSIS — E11.9: ICD-10-CM

## 2025-07-08 DIAGNOSIS — H02.834: ICD-10-CM

## 2025-07-08 DIAGNOSIS — H25.13: ICD-10-CM

## 2025-07-08 DIAGNOSIS — H16.223: ICD-10-CM

## 2025-07-08 DIAGNOSIS — H43.813: ICD-10-CM

## 2025-07-08 DIAGNOSIS — H02.831: ICD-10-CM

## 2025-07-08 PROBLEM — H11.153 PINGUECULA: Status: ACTIVE | Noted: 2025-07-08

## 2025-07-08 PROBLEM — H01.005 BLEPHARITIS; RIGHT UPPER LID, RIGHT LOWER LID, LEFT UPPER LID, LEFT LOWER LID: Status: ACTIVE | Noted: 2025-07-08

## 2025-07-08 PROBLEM — H01.004 BLEPHARITIS; RIGHT UPPER LID, RIGHT LOWER LID, LEFT UPPER LID, LEFT LOWER LID: Status: ACTIVE | Noted: 2025-07-08

## 2025-07-08 PROBLEM — H01.002 BLEPHARITIS; RIGHT UPPER LID, RIGHT LOWER LID, LEFT UPPER LID, LEFT LOWER LID: Status: ACTIVE | Noted: 2025-07-08

## 2025-07-08 PROBLEM — H01.001 BLEPHARITIS; RIGHT UPPER LID, RIGHT LOWER LID, LEFT UPPER LID, LEFT LOWER LID: Status: ACTIVE | Noted: 2025-07-08

## 2025-07-08 PROCEDURE — 92134 CPTRZ OPH DX IMG PST SGM RTA: CPT | Performed by: OPHTHALMOLOGY

## 2025-07-08 PROCEDURE — 92014 COMPRE OPH EXAM EST PT 1/>: CPT | Performed by: OPHTHALMOLOGY

## 2025-07-08 ASSESSMENT — REFRACTION_CURRENTRX
OS_SPHERE: -1.75
OS_ADD: +2.25
OS_CYLINDER: -1.50
OS_OVR_VA: 20/
OD_AXIS: 085
OS_ADD: +2.50
OD_SPHERE: -1.00
OD_AXIS: 087
OS_VPRISM_DIRECTION: PROGS
OD_AXIS: 069
OD_AXIS: 104
OS_ADD: +2.50
OS_OVR_VA: 20/
OD_ADD: +2.50
OD_AXIS: 099
OD_OVR_VA: 20/
OS_VPRISM_DIRECTION: PROGS
OS_VPRISM_DIRECTION: PROGS
OD_SPHERE: -1.25
OD_SPHERE: -0.75
OS_SPHERE: -0.50
OD_SPHERE: -1.25
OS_SPHERE: -1.75
OS_CYLINDER: -1.50
OS_SPHERE: -1.75
OD_CYLINDER: -1.25
OD_CYLINDER: -1.25
OS_ADD: +2.00
OS_AXIS: 085
OS_CYLINDER: -1.75
OD_CYLINDER: -1.75
OD_CYLINDER: -1.25
OD_SPHERE: PLANO
OS_AXIS: 083
OS_CYLINDER: -1.75
OD_VPRISM_DIRECTION: PROGS
OS_SPHERE: -1.50
OD_OVR_VA: 20/
OD_OVR_VA: 20/
OS_CYLINDER: -1.50
OD_SPHERE: PLANO
OS_ADD: +2.50
OS_AXIS: 078
OD_VPRISM_DIRECTION: PROGS
OD_ADD: +2.00
OS_CYLINDER: -1.50
OS_AXIS: 080
OD_ADD: +2.50
OD_CYLINDER: -1.25
OS_OVR_VA: 20/
OD_SPHERE: -1.25
OD_ADD: +2.50
OS_AXIS: 101
OS_AXIS: 076
OD_CYLINDER: -1.25
OS_ADD: +2.50
OS_ADD: +2.50
OD_ADD: +2.50
OS_AXIS: 098
OD_VPRISM_DIRECTION: PROGS
OD_ADD: +2.75
OD_VPRISM_DIRECTION: PROGS
OD_CYLINDER: -1.75
OS_CYLINDER: -1.75
OS_AXIS: 074
OS_VPRISM_DIRECTION: PROGS
OD_AXIS: 098
OS_SPHERE: -1.75
OS_ADD: +2.50
OD_ADD: +2.50
OD_AXIS: 095
OD_ADD: +2.25
OD_CYLINDER: -1.25
OS_SPHERE: -1.50
OD_SPHERE: -0.75
OD_VPRISM_DIRECTION: PROGS
OS_VPRISM_DIRECTION: PROGS
OD_VPRISM_DIRECTION: PROGS
OS_CYLINDER: -1.75
OD_AXIS: 088
OD_SPHERE: -0.75
OS_SPHERE: -1.75
OS_VPRISM_DIRECTION: PROGS
OS_SPHERE: -0.50
OS_ADD: +2.75
OD_CYLINDER: -1.25
OS_CYLINDER: -1.50
OD_ADD: +2.50
OS_AXIS: 080
OD_AXIS: 101

## 2025-07-08 ASSESSMENT — REFRACTION_MANIFEST
OS_VA2: 20/20
OS_CYLINDER: -1.75
OD_ADD: +2.50
OD_SPHERE: -0.75
OS_ADD: +2.50
OS_AXIS: 080
OD_AXIS: 090
OD_SPHERE: -0.25
OD_VA2: 20/25(J1)
OD_SPHERE: +1.75
OD_CYLINDER: -2.25
OS_ADD: +2.50
OS_CYLINDER: -1.75
OD_AXIS: 090
OS_AXIS: 095
OS_CYLINDER: -2.50
OS_CYLINDER: -1.75
OS_ADD: +2.75
OS_SPHERE: +1.00
OD_AXIS: 105
OD_SPHERE: -0.75
OS_CYLINDER: -1.75
OD_ADD: +2.50
OS_VA1: 20/20-1
OS_VA1: 20/20-2
OD_VA1: 20/25-1
OD_AXIS: 100
OD_ADD: +2.50
OD_CYLINDER: -0.75
OS_AXIS: 080
OD_VA1: 20/20-1
OD_VA1: 20/20-1
OS_SPHERE: -1.25
OD_CYLINDER: -0.75
OS_SPHERE: -1.25
OD_VA2: 20/20
OD_ADD: +2.75
OS_AXIS: 78
OD_AXIS: 90
OS_VA2: 20/20
OD_VA1: 20/25
OS_VA1: 20/20-1
OD_VA2: 20/20
OD_CYLINDER: -0.75
OS_VA2: 20/20
OS_SPHERE: -0.75
OS_AXIS: 078
OS_VA1: 20/20-2
OS_VA2: 20/25(J1)
OD_VA2: 20/20
OD_CYLINDER: -1.75
OS_ADD: +2.50
OD_SPHERE: -0.25
OS_SPHERE: -0.75

## 2025-07-08 ASSESSMENT — VISUAL ACUITY
OS_BCVA: 20/25-2
OD_BCVA: 20/30

## 2025-07-08 ASSESSMENT — LID POSITION - COMMENTS
OS_COMMENTS: TEMPORAL HOODING
OD_COMMENTS: TEMPORAL HOODING

## 2025-07-08 ASSESSMENT — PACHYMETRY
OS_CT_CORRECTION: -4
OS_CT_UM: 595
OD_CT_UM: 583
OD_CT_CORRECTION: -3

## 2025-07-08 ASSESSMENT — LID POSITION - DERMATOCHALASIS
OS_DERMATOCHALASIS: LUL T
OD_DERMATOCHALASIS: RUL T

## 2025-07-08 ASSESSMENT — LID EXAM ASSESSMENTS
OS_BLEPHARITIS: LLL LUL 1+ 2+
OD_BLEPHARITIS: RLL RUL 1+ 2+

## 2025-07-08 ASSESSMENT — TONOMETRY
OD_IOP_MMHG: 17
OS_IOP_MMHG: 16

## 2025-07-08 ASSESSMENT — CONFRONTATIONAL VISUAL FIELD TEST (CVF)
OS_FINDINGS: FULL
OD_FINDINGS: FULL

## 2025-07-08 ASSESSMENT — KERATOMETRY
METHOD_AUTO_MANUAL: AUTO
OS_K1POWER_DIOPTERS: 41.50
OD_K1POWER_DIOPTERS: 42.00
OS_AXISANGLE_DEGREES: 062
OD_AXISANGLE_DEGREES: 036
OS_K2POWER_DIOPTERS: 42.50
OD_K2POWER_DIOPTERS: 42.25

## 2025-07-08 ASSESSMENT — REFRACTION_AUTOREFRACTION
OD_SPHERE: 0.00
OS_CYLINDER: -1.25
OS_AXIS: 111
OS_SPHERE: 0.00
OD_AXIS: 100
OD_CYLINDER: -1.75

## 2025-07-08 ASSESSMENT — VASCULARIZATION
OS_VASCULARIZATION: NASAL PANNUS
OD_VASCULARIZATION: NASAL PANNUS

## 2025-07-17 ENCOUNTER — RESULT REVIEW (OUTPATIENT)
Age: 75
End: 2025-07-17

## 2025-07-17 ENCOUNTER — APPOINTMENT (OUTPATIENT)
Dept: HEMATOLOGY ONCOLOGY | Facility: CLINIC | Age: 75
End: 2025-07-17
Payer: MEDICARE

## 2025-07-17 VITALS
TEMPERATURE: 97.7 F | RESPIRATION RATE: 16 BRPM | DIASTOLIC BLOOD PRESSURE: 76 MMHG | WEIGHT: 171.08 LBS | BODY MASS INDEX: 33.41 KG/M2 | HEART RATE: 79 BPM | SYSTOLIC BLOOD PRESSURE: 119 MMHG | OXYGEN SATURATION: 97 %

## 2025-07-17 LAB
ALBUMIN SERPL ELPH-MCNC: 4.1 G/DL
ALP BLD-CCNC: 95 U/L
ALT SERPL-CCNC: 704 U/L
ANION GAP SERPL CALC-SCNC: 14 MMOL/L
AST SERPL-CCNC: 387 U/L
BASOPHILS # BLD AUTO: 0.09 K/UL — SIGNIFICANT CHANGE UP (ref 0–0.2)
BASOPHILS NFR BLD AUTO: 2.1 % — HIGH (ref 0–2)
BILIRUB SERPL-MCNC: 0.6 MG/DL
BUN SERPL-MCNC: 11 MG/DL
CALCIUM SERPL-MCNC: 10.6 MG/DL
CANCER AG27-29 SERPL-ACNC: 46.3 U/ML
CEA SERPL-MCNC: 3.3 NG/ML
CHLORIDE SERPL-SCNC: 102 MMOL/L
CO2 SERPL-SCNC: 24 MMOL/L
CREAT SERPL-MCNC: 0.62 MG/DL
EGFRCR SERPLBLD CKD-EPI 2021: 93 ML/MIN/1.73M2
EOSINOPHIL # BLD AUTO: 0.09 K/UL — SIGNIFICANT CHANGE UP (ref 0–0.5)
EOSINOPHIL NFR BLD AUTO: 2.1 % — SIGNIFICANT CHANGE UP (ref 0–6)
GLUCOSE SERPL-MCNC: 165 MG/DL
HCT VFR BLD CALC: 44.7 % — SIGNIFICANT CHANGE UP (ref 34.5–45)
HGB BLD-MCNC: 14.6 G/DL — SIGNIFICANT CHANGE UP (ref 11.5–15.5)
IMM GRANULOCYTES NFR BLD AUTO: 0.5 % — SIGNIFICANT CHANGE UP (ref 0–0.9)
LYMPHOCYTES # BLD AUTO: 1.52 K/UL — SIGNIFICANT CHANGE UP (ref 1–3.3)
LYMPHOCYTES # BLD AUTO: 35.2 % — SIGNIFICANT CHANGE UP (ref 13–44)
MCHC RBC-ENTMCNC: 30.4 PG — SIGNIFICANT CHANGE UP (ref 27–34)
MCHC RBC-ENTMCNC: 32.7 G/DL — SIGNIFICANT CHANGE UP (ref 32–36)
MCV RBC AUTO: 92.9 FL — SIGNIFICANT CHANGE UP (ref 80–100)
MONOCYTES # BLD AUTO: 0.61 K/UL — SIGNIFICANT CHANGE UP (ref 0–0.9)
MONOCYTES NFR BLD AUTO: 14.1 % — HIGH (ref 2–14)
NEUTROPHILS # BLD AUTO: 1.99 K/UL — SIGNIFICANT CHANGE UP (ref 1.8–7.4)
NEUTROPHILS NFR BLD AUTO: 46 % — SIGNIFICANT CHANGE UP (ref 43–77)
NRBC BLD AUTO-RTO: 0 /100 WBCS — SIGNIFICANT CHANGE UP (ref 0–0)
PLATELET # BLD AUTO: 141 K/UL — LOW (ref 150–400)
POTASSIUM SERPL-SCNC: 4.2 MMOL/L
PROT SERPL-MCNC: 6.6 G/DL
RBC # BLD: 4.81 M/UL — SIGNIFICANT CHANGE UP (ref 3.8–5.2)
RBC # FLD: 16.9 % — HIGH (ref 10.3–14.5)
SODIUM SERPL-SCNC: 139 MMOL/L
WBC # BLD: 4.32 K/UL — SIGNIFICANT CHANGE UP (ref 3.8–10.5)
WBC # FLD AUTO: 4.32 K/UL — SIGNIFICANT CHANGE UP (ref 3.8–10.5)

## 2025-07-17 PROCEDURE — 99214 OFFICE O/P EST MOD 30 MIN: CPT

## 2025-07-29 ENCOUNTER — APPOINTMENT (OUTPATIENT)
Dept: INTERNAL MEDICINE | Facility: CLINIC | Age: 75
End: 2025-07-29
Payer: MEDICARE

## 2025-07-29 VITALS
HEIGHT: 62 IN | DIASTOLIC BLOOD PRESSURE: 70 MMHG | HEART RATE: 81 BPM | OXYGEN SATURATION: 97 % | SYSTOLIC BLOOD PRESSURE: 104 MMHG | TEMPERATURE: 98.1 F | WEIGHT: 171 LBS | BODY MASS INDEX: 31.47 KG/M2

## 2025-07-29 DIAGNOSIS — E11.9 TYPE 2 DIABETES MELLITUS W/OUT COMPLICATIONS: ICD-10-CM

## 2025-07-29 DIAGNOSIS — Z17.0 MALIGNANT NEOPLASM OF AXILLARY TAIL OF RIGHT FEMALE BREAST: ICD-10-CM

## 2025-07-29 DIAGNOSIS — E07.9 DISORDER OF THYROID, UNSPECIFIED: ICD-10-CM

## 2025-07-29 DIAGNOSIS — M35.3 POLYMYALGIA RHEUMATICA: ICD-10-CM

## 2025-07-29 DIAGNOSIS — C50.611 MALIGNANT NEOPLASM OF AXILLARY TAIL OF RIGHT FEMALE BREAST: ICD-10-CM

## 2025-07-29 PROCEDURE — G0439: CPT

## 2025-07-31 ENCOUNTER — APPOINTMENT (OUTPATIENT)
Dept: HEMATOLOGY ONCOLOGY | Facility: CLINIC | Age: 75
End: 2025-07-31

## 2025-07-31 ENCOUNTER — NON-APPOINTMENT (OUTPATIENT)
Age: 75
End: 2025-07-31

## 2025-08-12 ENCOUNTER — APPOINTMENT (OUTPATIENT)
Dept: HEMATOLOGY ONCOLOGY | Facility: CLINIC | Age: 75
End: 2025-08-12
Payer: MEDICARE

## 2025-08-12 ENCOUNTER — RESULT REVIEW (OUTPATIENT)
Age: 75
End: 2025-08-12

## 2025-08-12 VITALS
HEIGHT: 62.44 IN | TEMPERATURE: 97.2 F | DIASTOLIC BLOOD PRESSURE: 66 MMHG | BODY MASS INDEX: 31.33 KG/M2 | HEART RATE: 73 BPM | RESPIRATION RATE: 16 BRPM | WEIGHT: 174.61 LBS | SYSTOLIC BLOOD PRESSURE: 107 MMHG | OXYGEN SATURATION: 99 %

## 2025-08-12 DIAGNOSIS — Z79.899 OTHER LONG TERM (CURRENT) DRUG THERAPY: ICD-10-CM

## 2025-08-12 DIAGNOSIS — C50.912 MALIGNANT NEOPLASM OF UNSPECIFIED SITE OF LEFT FEMALE BREAST: ICD-10-CM

## 2025-08-12 DIAGNOSIS — Z79.811 LONG TERM (CURRENT) USE OF AROMATASE INHIBITORS: ICD-10-CM

## 2025-08-12 DIAGNOSIS — R74.8 ABNORMAL LEVELS OF OTHER SERUM ENZYMES: ICD-10-CM

## 2025-08-12 LAB
ALBUMIN SERPL ELPH-MCNC: 4.1 G/DL
ALP BLD-CCNC: 84 U/L
ALT SERPL-CCNC: 90 U/L
ANION GAP SERPL CALC-SCNC: 13 MMOL/L
AST SERPL-CCNC: 55 U/L
BASOPHILS # BLD AUTO: 0.06 K/UL — SIGNIFICANT CHANGE UP (ref 0–0.2)
BASOPHILS NFR BLD AUTO: 1.1 % — SIGNIFICANT CHANGE UP (ref 0–2)
BILIRUB SERPL-MCNC: 0.6 MG/DL
BUN SERPL-MCNC: 15 MG/DL
CALCIUM SERPL-MCNC: 9.6 MG/DL
CEA SERPL-MCNC: 4 NG/ML
CHLORIDE SERPL-SCNC: 104 MMOL/L
CO2 SERPL-SCNC: 24 MMOL/L
CREAT SERPL-MCNC: 0.63 MG/DL
EGFRCR SERPLBLD CKD-EPI 2021: 92 ML/MIN/1.73M2
EOSINOPHIL # BLD AUTO: 0.29 K/UL — SIGNIFICANT CHANGE UP (ref 0–0.5)
EOSINOPHIL NFR BLD AUTO: 5.2 % — SIGNIFICANT CHANGE UP (ref 0–6)
GLUCOSE SERPL-MCNC: 163 MG/DL
HCT VFR BLD CALC: 45.1 % — HIGH (ref 34.5–45)
HGB BLD-MCNC: 14.7 G/DL — SIGNIFICANT CHANGE UP (ref 11.5–15.5)
IMM GRANULOCYTES NFR BLD AUTO: 0.5 % — SIGNIFICANT CHANGE UP (ref 0–0.9)
LYMPHOCYTES # BLD AUTO: 1.48 K/UL — SIGNIFICANT CHANGE UP (ref 1–3.3)
LYMPHOCYTES # BLD AUTO: 26.8 % — SIGNIFICANT CHANGE UP (ref 13–44)
MCHC RBC-ENTMCNC: 30.5 PG — SIGNIFICANT CHANGE UP (ref 27–34)
MCHC RBC-ENTMCNC: 32.6 G/DL — SIGNIFICANT CHANGE UP (ref 32–36)
MCV RBC AUTO: 93.6 FL — SIGNIFICANT CHANGE UP (ref 80–100)
MONOCYTES # BLD AUTO: 0.78 K/UL — SIGNIFICANT CHANGE UP (ref 0–0.9)
MONOCYTES NFR BLD AUTO: 14.1 % — HIGH (ref 2–14)
NEUTROPHILS # BLD AUTO: 2.89 K/UL — SIGNIFICANT CHANGE UP (ref 1.8–7.4)
NEUTROPHILS NFR BLD AUTO: 52.3 % — SIGNIFICANT CHANGE UP (ref 43–77)
NRBC BLD AUTO-RTO: 0 /100 WBCS — SIGNIFICANT CHANGE UP (ref 0–0)
PLATELET # BLD AUTO: 143 K/UL — LOW (ref 150–400)
POTASSIUM SERPL-SCNC: 4.9 MMOL/L
PROT SERPL-MCNC: 6.7 G/DL
RBC # BLD: 4.82 M/UL — SIGNIFICANT CHANGE UP (ref 3.8–5.2)
RBC # FLD: 15.9 % — HIGH (ref 10.3–14.5)
SODIUM SERPL-SCNC: 141 MMOL/L
WBC # BLD: 5.53 K/UL — SIGNIFICANT CHANGE UP (ref 3.8–10.5)
WBC # FLD AUTO: 5.53 K/UL — SIGNIFICANT CHANGE UP (ref 3.8–10.5)

## 2025-08-12 PROCEDURE — 99214 OFFICE O/P EST MOD 30 MIN: CPT

## 2025-08-13 LAB — CANCER AG27-29 SERPL-ACNC: 44.4 U/ML

## 2025-08-15 ENCOUNTER — RX RENEWAL (OUTPATIENT)
Age: 75
End: 2025-08-15

## 2025-08-29 ENCOUNTER — RESULT REVIEW (OUTPATIENT)
Age: 75
End: 2025-08-29

## 2025-08-29 ENCOUNTER — APPOINTMENT (OUTPATIENT)
Dept: HEMATOLOGY ONCOLOGY | Facility: CLINIC | Age: 75
End: 2025-08-29
Payer: MEDICARE

## 2025-08-29 VITALS
OXYGEN SATURATION: 97 % | WEIGHT: 174.16 LBS | BODY MASS INDEX: 32.05 KG/M2 | RESPIRATION RATE: 16 BRPM | SYSTOLIC BLOOD PRESSURE: 114 MMHG | HEART RATE: 76 BPM | TEMPERATURE: 97.1 F | DIASTOLIC BLOOD PRESSURE: 76 MMHG | HEIGHT: 61.81 IN

## 2025-08-29 DIAGNOSIS — Z79.811 LONG TERM (CURRENT) USE OF AROMATASE INHIBITORS: ICD-10-CM

## 2025-08-29 DIAGNOSIS — C50.912 MALIGNANT NEOPLASM OF UNSPECIFIED SITE OF LEFT FEMALE BREAST: ICD-10-CM

## 2025-08-29 LAB
ALBUMIN SERPL ELPH-MCNC: 4 G/DL
ALP BLD-CCNC: 81 U/L
ALT SERPL-CCNC: 68 U/L
ANION GAP SERPL CALC-SCNC: 12 MMOL/L
AST SERPL-CCNC: 48 U/L
BILIRUB SERPL-MCNC: 0.4 MG/DL
BUN SERPL-MCNC: 13 MG/DL
CALCIUM SERPL-MCNC: 9.2 MG/DL
CANCER AG27-29 SERPL-ACNC: 44.8 U/ML
CEA SERPL-MCNC: 3.4 NG/ML
CHLORIDE SERPL-SCNC: 103 MMOL/L
CO2 SERPL-SCNC: 25 MMOL/L
CREAT SERPL-MCNC: 0.63 MG/DL
EGFRCR SERPLBLD CKD-EPI 2021: 92 ML/MIN/1.73M2
GLUCOSE SERPL-MCNC: 213 MG/DL
POTASSIUM SERPL-SCNC: 4.7 MMOL/L
PROT SERPL-MCNC: 6.2 G/DL
SODIUM SERPL-SCNC: 141 MMOL/L

## 2025-08-29 PROCEDURE — 99214 OFFICE O/P EST MOD 30 MIN: CPT

## 2025-08-29 RX ORDER — PALBOCICLIB 75 MG/1
75 TABLET, FILM COATED ORAL
Qty: 21 | Refills: 6 | Status: ACTIVE | COMMUNITY
Start: 2025-08-29 | End: 1900-01-01

## 2025-09-11 ENCOUNTER — RESULT REVIEW (OUTPATIENT)
Age: 75
End: 2025-09-11

## 2025-09-11 ENCOUNTER — APPOINTMENT (OUTPATIENT)
Dept: HEMATOLOGY ONCOLOGY | Facility: CLINIC | Age: 75
End: 2025-09-11
Payer: MEDICARE

## 2025-09-11 VITALS
OXYGEN SATURATION: 99 % | HEART RATE: 75 BPM | BODY MASS INDEX: 32.46 KG/M2 | WEIGHT: 176.37 LBS | DIASTOLIC BLOOD PRESSURE: 73 MMHG | RESPIRATION RATE: 16 BRPM | TEMPERATURE: 98.1 F | SYSTOLIC BLOOD PRESSURE: 109 MMHG

## 2025-09-11 DIAGNOSIS — C50.611 MALIGNANT NEOPLASM OF AXILLARY TAIL OF RIGHT FEMALE BREAST: ICD-10-CM

## 2025-09-11 DIAGNOSIS — Z17.0 MALIGNANT NEOPLASM OF AXILLARY TAIL OF RIGHT FEMALE BREAST: ICD-10-CM

## 2025-09-11 LAB
ALBUMIN SERPL ELPH-MCNC: 4 G/DL
ALP BLD-CCNC: 74 U/L
ALT SERPL-CCNC: 81 U/L
ANION GAP SERPL CALC-SCNC: 15 MMOL/L
AST SERPL-CCNC: 47 U/L
BILIRUB SERPL-MCNC: 0.7 MG/DL
BUN SERPL-MCNC: 15 MG/DL
CALCIUM SERPL-MCNC: 9.7 MG/DL
CEA SERPL-MCNC: 2.9 NG/ML
CHLORIDE SERPL-SCNC: 106 MMOL/L
CO2 SERPL-SCNC: 22 MMOL/L
CREAT SERPL-MCNC: 0.79 MG/DL
EGFRCR SERPLBLD CKD-EPI 2021: 78 ML/MIN/1.73M2
GLUCOSE SERPL-MCNC: 160 MG/DL
POTASSIUM SERPL-SCNC: 6.1 MMOL/L
PROT SERPL-MCNC: 6.4 G/DL
SODIUM SERPL-SCNC: 143 MMOL/L

## 2025-09-11 PROCEDURE — G2211 COMPLEX E/M VISIT ADD ON: CPT

## 2025-09-11 PROCEDURE — 99214 OFFICE O/P EST MOD 30 MIN: CPT

## 2025-09-12 LAB — CANCER AG27-29 SERPL-ACNC: 42.4 U/ML

## (undated) DEVICE — WARMING BLANKET LOWER ADULT

## (undated) DEVICE — ELCTR BOVIE PENCIL SMOKE EVACUATION

## (undated) DEVICE — POSITIONER STRAP ARMBOARD VELCRO TS-30

## (undated) DEVICE — SOL IRR POUR H2O 500ML

## (undated) DEVICE — PROBE LOCALIZER W/ DRAPES

## (undated) DEVICE — PACK BREAST MINOR

## (undated) DEVICE — SUT VICRYL 3-0 27" SH UNDYED

## (undated) DEVICE — SUT MONOCRYL 4-0 27" PS-2 UNDYED

## (undated) DEVICE — GLV 6.5 PROTEXIS (WHITE)

## (undated) DEVICE — DRSG COMBINE 5X9"

## (undated) DEVICE — ELCTR GROUNDING PAD ADULT COVIDIEN